# Patient Record
Sex: MALE | Race: WHITE | Employment: FULL TIME | URBAN - METROPOLITAN AREA
[De-identification: names, ages, dates, MRNs, and addresses within clinical notes are randomized per-mention and may not be internally consistent; named-entity substitution may affect disease eponyms.]

---

## 2017-01-09 ENCOUNTER — LAB ONLY (OUTPATIENT)
Dept: ENDOCRINOLOGY | Age: 52
End: 2017-01-09

## 2017-01-09 DIAGNOSIS — E78.5 HYPERLIPIDEMIA LDL GOAL <70: ICD-10-CM

## 2017-01-09 DIAGNOSIS — E11.40 TYPE 2 DIABETES, CONTROLLED, WITH NEUROPATHY (HCC): Primary | ICD-10-CM

## 2017-01-09 DIAGNOSIS — E29.1 HYPOGONADISM IN MALE: ICD-10-CM

## 2017-01-10 LAB
ALBUMIN SERPL-MCNC: 4.4 G/DL (ref 3.5–5.5)
ALBUMIN/CREAT UR: 10.3 MG/G CREAT (ref 0–30)
ALBUMIN/GLOB SERPL: 1.6 {RATIO} (ref 1.1–2.5)
ALP SERPL-CCNC: 91 IU/L (ref 39–117)
ALT SERPL-CCNC: 16 IU/L (ref 0–44)
AST SERPL-CCNC: 16 IU/L (ref 0–40)
BILIRUB SERPL-MCNC: 0.2 MG/DL (ref 0–1.2)
BUN SERPL-MCNC: 14 MG/DL (ref 6–24)
BUN/CREAT SERPL: 19 (ref 9–20)
CALCIUM SERPL-MCNC: 9.4 MG/DL (ref 8.7–10.2)
CHLORIDE SERPL-SCNC: 102 MMOL/L (ref 96–106)
CHOLEST SERPL-MCNC: 170 MG/DL (ref 100–199)
CO2 SERPL-SCNC: 27 MMOL/L (ref 18–29)
COMMENT: NORMAL
CREAT SERPL-MCNC: 0.72 MG/DL (ref 0.76–1.27)
CREAT UR-MCNC: 131.9 MG/DL
EST. AVERAGE GLUCOSE BLD GHB EST-MCNC: 120 MG/DL
GLOBULIN SER CALC-MCNC: 2.7 G/DL (ref 1.5–4.5)
GLUCOSE SERPL-MCNC: 82 MG/DL (ref 65–99)
HBA1C MFR BLD: 5.8 % (ref 4.8–5.6)
HDLC SERPL-MCNC: 61 MG/DL
INTERPRETATION, 910389: NORMAL
LDLC SERPL CALC-MCNC: 85 MG/DL (ref 0–99)
MICROALBUMIN UR-MCNC: 13.6 UG/ML
POTASSIUM SERPL-SCNC: 5.5 MMOL/L (ref 3.5–5.2)
PROT SERPL-MCNC: 7.1 G/DL (ref 6–8.5)
SODIUM SERPL-SCNC: 144 MMOL/L (ref 134–144)
TESTOST SERPL-MCNC: 456 NG/DL (ref 348–1197)
TRIGL SERPL-MCNC: 119 MG/DL (ref 0–149)
VLDLC SERPL CALC-MCNC: 24 MG/DL (ref 5–40)

## 2017-01-16 ENCOUNTER — OFFICE VISIT (OUTPATIENT)
Dept: ENDOCRINOLOGY | Age: 52
End: 2017-01-16

## 2017-01-16 VITALS
SYSTOLIC BLOOD PRESSURE: 138 MMHG | BODY MASS INDEX: 27.22 KG/M2 | HEART RATE: 84 BPM | WEIGHT: 194.4 LBS | DIASTOLIC BLOOD PRESSURE: 84 MMHG | HEIGHT: 71 IN

## 2017-01-16 DIAGNOSIS — E78.5 HYPERLIPIDEMIA LDL GOAL <70: ICD-10-CM

## 2017-01-16 DIAGNOSIS — N52.9 VASCULOGENIC ERECTILE DYSFUNCTION, UNSPECIFIED VASCULOGENIC ERECTILE DYSFUNCTION TYPE: ICD-10-CM

## 2017-01-16 DIAGNOSIS — E11.40 TYPE 2 DIABETES, CONTROLLED, WITH NEUROPATHY (HCC): Primary | ICD-10-CM

## 2017-01-16 DIAGNOSIS — E29.1 HYPOGONADISM IN MALE: ICD-10-CM

## 2017-01-16 RX ORDER — SILDENAFIL 100 MG/1
100 TABLET, FILM COATED ORAL AS NEEDED
Qty: 8 TAB | Refills: 11 | Status: SHIPPED | OUTPATIENT
Start: 2017-01-16 | End: 2017-04-13

## 2017-01-16 RX ORDER — VARDENAFIL HYDROCHLORIDE 20 MG/1
20 TABLET ORAL AS NEEDED
Qty: 4 TAB | Refills: 0 | Status: SHIPPED | COMMUNITY
Start: 2017-01-16 | End: 2017-04-10

## 2017-01-16 RX ORDER — SILDENAFIL CITRATE 20 MG/1
TABLET ORAL
Qty: 20 TAB | Refills: 11 | Status: SHIPPED | OUTPATIENT
Start: 2017-01-16 | End: 2017-07-17

## 2017-01-16 NOTE — MR AVS SNAPSHOT
Visit Information Date & Time Provider Department Dept. Phone Encounter #  
 1/16/2017 11:30 AM Eboni Berumen, [address] [place] [address] Diabetes and Endocrinology [phone] [id number] Follow-up Instructions Return in about 6 months (around 7/16/2017) for fasting labs. Upcoming Health Maintenance Date Due Hepatitis C Screening 1965 EYE EXAM RETINAL OR DILATED Q1 4/22/1975 Pneumococcal 19-64 Medium Risk (1 of 1 - PPSV23) 4/22/1984 DTaP/Tdap/Td series (1 - Tdap) 4/22/1986 FOBT Q 1 YEAR AGE 50-75 4/22/2015 INFLUENZA AGE 9 TO ADULT 8/1/2016 HEMOGLOBIN A1C Q6M 7/9/2017 FOOT EXAM Q1 7/18/2017 MICROALBUMIN Q1 1/9/2018 LIPID PANEL Q1 1/9/2018 Allergies as of 1/16/2017  Review Complete On: 1/16/2017 By: Eboni Berumen MD  
 No Known Allergies Current Immunizations  Never Reviewed No immunizations on file. Not reviewed this visit Vitals BP Pulse Height(growth percentile) Weight(growth percentile) BMI Smoking Status 138/84 84 5' 11\" (1.803 m) 194 lb 6.4 oz (88.2 kg) 27.11 kg/m2 Current Every Day Smoker Vitals History BMI and BSA Data Body Mass Index Body Surface Area  
 27.11 kg/m 2 2.1 m 2 Preferred Pharmacy Pharmacy Name Phone [id number] [address] [place] [address] [phone] Your Updated Medication List  
  
   
This list is accurate as of: 1/16/17 12:17 PM.  Always use your most recent med list.  
  
  
  
  
 atorvastatin 20 mg tablet Commonly known as:  LIPITOR Take  by mouth daily. DAILY MULTIVITAMIN PO Take  by mouth.  
  
 gabapentin 300 mg capsule Commonly known as:  NEURONTIN Take 300 mg by mouth three (3) times daily. metFORMIN  mg tablet Commonly known as:  GLUCOPHAGE XR  
1 tab twice daily * sildenafil citrate 100 mg tablet Commonly known as:  VIAGRA Take 1 Tab by mouth as needed. * sildenafil 20 mg tablet Commonly known as:  REVATIO Take up to 5 tabs as needed prior to activity  
  
 vardenafil 20 mg tablet Commonly known as:  LEVITRA Take 20 mg by mouth as needed. * Notice: This list has 2 medication(s) that are the same as other medications prescribed for you. Read the directions carefully, and ask your doctor or other care provider to review them with you. Prescriptions Printed Refills  
 sildenafil citrate (VIAGRA) 100 mg tablet 11 Sig: Take 1 Tab by mouth as needed. Class: Print Route: Oral  
 sildenafil (REVATIO) 20 mg tablet 11 Sig: Take up to 5 tabs as needed prior to activity Class: Print Follow-up Instructions Return in about 6 months (around 7/16/2017) for fasting labs. Patient Instructions 1) Revatio is generic for viagra. They come in 20 mg tablets and often you need to take 3 to 5 tablets at a max dose to see any effect on erectile function. Take this 30-60 minutes before activity and it will last in your system 4-6 hours. Take this to [facility] as this is the cheapest place to buy this. I will also print out a prescription for brand viagra as this may be covered at the 100 mg tabs and you can try taking 1/2 tab as needed. 2) Your testosterone level is back to normal with 40 more lbs of weight loss so this is not the cause of ED. 3) Your Hemoglobin A1c (3 month test of blood sugar) continues to improve with weight loss. Keep taking metformin 1 tab twice daily. 4) Your cholesterol is much better with weight loss. 5) Try cutting back on the bananas to 1/2 per day as your potassium was slightly high at 5.5 and this can cause leg cramps. 6) You can Dr. Mamta Jenkins at [phone] or Dr. Macho Styles at [phone] or Dr. Eric Salcido [phone] for podiatry to have your foot wound evaluated. Introducing [address]! [facility] introduces [employer] patient portal. Now you can access parts of your medical record, email your doctor's office, and request medication refills online. 1. In your internet browser, go to [email or web address] 2. Click on the First Time User? Click Here link in the Sign In box. You will see the New Member Sign Up page. 3. Enter your [employer] Access Code exactly as it appears below. You will not need to use this code after youve completed the sign-up process. If you do not sign up before the expiration date, you must request a new code. · [employer] Access Code: [email or web address] Expires: 4/16/2017 12:16 PM 
 
4. Enter the last four digits of your Social Security Number (xxxx) and Date of Birth (mm/dd/yyyy) as indicated and click Submit. You will be taken to the next sign-up page. 5. Create a [employer] ID. This will be your [employer] login ID and cannot be changed, so think of one that is secure and easy to remember. 6. Create a [employer] password. You can change your password at any time. 7. Enter your Password Reset Question and Answer. This can be used at a later time if you forget your password. 8. Enter your e-mail address. You will receive e-mail notification when new information is available in [address] 9. Click Sign Up. You can now view and download portions of your medical record. 10. Click the Download Summary menu link to download a portable copy of your medical information. If you have questions, please visit the Frequently Asked Questions section of the [employer] website. Remember, [employer] is NOT to be used for urgent needs. For medical emergencies, dial 911. Now available from your iPhone and Android! Please provide this summary of care documentation to your next provider. Your primary care clinician is listed as GALO Cisneros 47. If you have any questions after today's visit, please call [phone].

## 2017-01-16 NOTE — PROGRESS NOTES
Chief Complaint   Patient presents with    Diabetes     testosterone f/u    Other     pt does not know the name of his pcp and pharmacy confirmed     History of Present Illness: Annie Reynolds is a 46 y.o. male here for follow up of diabetes. Weight down 40 lbs since last visit in 7/16. Has been walking between 5-10 miles a day and has been eating more fruits and vegetables. Has cut out snack foods and cheese. Has been taking metformin 1 tab bid and lipitor 20 mg daily and gabapentin 300 mg tid. Nerve pain has been better. The sample of levitra did help but can't afford and it appears viagra may be covered so will try this instead. Does have some leg cramps that can occur a few times a week. Has been eating more bananas and oranges. 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 Completed the course of keflex for 7 days. Gave him names of podiatrists to f/u with. Has increased his smoking from 5 cig/day up to 20/day. Still drinking about the same. Current Outpatient Prescriptions   Medication Sig    atorvastatin (LIPITOR) 20 mg tablet Take  by mouth daily.  gabapentin (NEURONTIN) 300 mg capsule Take 300 mg by mouth three (3) times daily.  metFORMIN ER (GLUCOPHAGE XR) 500 mg tablet 1 tab twice daily    MV-MN/FA/VIT K/LYCOP/LUT/COQ10 (DAILY MULTIVITAMIN PO) Take  by mouth. No current facility-administered medications for this visit. No Known Allergies     Review of Systems:  - Eyes: no blurry vision or double vision  - Cardiovascular: no chest pain  - Respiratory: no shortness of breath  - Musculoskeletal: no myalgias  - Neurological: decreased numbness/tingling in extremities    Physical Examination:  Blood pressure 138/84, pulse 84, height 5' 11\" (1.803 m), weight 194 lb 6.4 oz (88.2 kg).   - General: pleasant, no distress, good eye contact - Neck: no carotid bruits  - Cardiovascular: regular, normal rate, nl s1 and s2, no m/r/g,   - Respiratory: clear bilaterally  - Integumentary: no edema, (+) 2x2 cm right plantar ulcer with clean base and no drainage and no erythema  - Psychiatric: normal mood and affect    Data Reviewed:   Component      Latest Ref Rng & Units 1/9/2017 1/9/2017 1/9/2017 1/9/2017           9:16 AM  9:16 AM  9:16 AM  9:16 AM   Glucose      65 - 99 mg/dL    82   BUN      6 - 24 mg/dL    14   Creatinine      0.76 - 1.27 mg/dL    0.72 (L)   GFR est non-AA      >59 mL/min/1.73    108   GFR est AA      >59 mL/min/1.73    125   BUN/Creatinine ratio      9 - 20    19   Sodium      134 - 144 mmol/L    144   Potassium      3.5 - 5.2 mmol/L    5.5 (H)   Chloride      96 - 106 mmol/L    102   CO2      18 - 29 mmol/L    27   Calcium      8.7 - 10.2 mg/dL    9.4   Protein, total      6.0 - 8.5 g/dL    7.1   Albumin      3.5 - 5.5 g/dL    4.4   GLOBULIN, TOTAL      1.5 - 4.5 g/dL    2.7   A-G Ratio      1.1 - 2.5    1.6   Bilirubin, total      0.0 - 1.2 mg/dL    0.2   Alk. phosphatase      39 - 117 IU/L    91   AST      0 - 40 IU/L    16   ALT      0 - 44 IU/L    16   Cholesterol, total      100 - 199 mg/dL   170    Triglyceride      0 - 149 mg/dL   119    HDL Cholesterol      >39 mg/dL   61    VLDL, calculated      5 - 40 mg/dL   24    LDL, calculated      0 - 99 mg/dL   85    Testosterone      348 - 1197 ng/dL  456     Comment        Comment     Hemoglobin A1c, (calculated)      4.8 - 5.6 % 5.8 (H)      Estimated average glucose      mg/dL 120        Component      Latest Ref Rng & Units 1/9/2017           9:16 AM   Creatinine, urine      Not Estab. mg/dL 131.9   Microalbumin, urine      Not Estab. ug/mL 13.6   Microalbumin/Creat. Ratio      0.0 - 30.0 mg/g creat 10.3       Assessment/Plan:     1.  Type 2 diabetes, controlled, with neuropathy ([address]): his most recent Hgb A1c was 5.8% in 1/17 down from 6.4% in 7/16 stable from 3/16 down from 7% in 12/15 when diagnosed with DM. Metformin is working well and will hold on checking sugars as long as A1c is <7%. Has lost 58 lbs since 12/15.  - cont metformin  mg bid  - foot exam done 7/16  - optho UTD 5/16--will obtain report  - microalbumin nl 1/17  - his BP was at goal < 140/90 not on any meds  - check A1c and cmp prior to next visit      2. Hyperlipidemia LDL goal <70: LDL 78 in 3/16 after being started on lipitor 20 in 12/15 when  and TG 1152. LDL 71 and  in 7/16 and TG down to 119 and LDL 85 in 1/17 with 40 lb wt loss  - cont lipitor 20 mg daily  - check lipids prior to next visit     3. Vasculogenic erectile dysfunction, unspecified vasculogenic erectile dysfunction type: told him that his ED is likely multifactorial due to DM, hyperlipidemia, ETOH and smoking.  - levitra 20 mg as needed      4. Hypogonadism in male: level was 156 in 12/15 but free T normal at 5.27 and prolactin normal at 9.2. T up to 298 in 7/16 with 18 lb wt loss and up to 456 in 1/17 with 40 lb wt loss  - check total testosterone level in 1/18        Patient Instructions   1) Revatio is generic for viagra. They come in 20 mg tablets and often you need to take 3 to 5 tablets at a max dose to see any effect on erectile function. Take this 30-60 minutes before activity and it will last in your system 4-6 hours. Take this to [facility] as this is the cheapest place to buy this. I will also print out a prescription for brand viagra as this may be covered at the 100 mg tabs and you can try taking 1/2 tab as needed. 2) Your testosterone level is back to normal with 40 more lbs of weight loss so this is not the cause of ED. 3) Your Hemoglobin A1c (3 month test of blood sugar) continues to improve with weight loss. Keep taking metformin 1 tab twice daily. 4) Your cholesterol is much better with weight loss.     5) Try cutting back on the bananas to 1/2 per day as your potassium was slightly high at 5.5 and this can cause leg cramps. 6) You can Dr. Minetta Cowden at [phone] or Dr. Park Dumont at [phone] or Dr. Uriah Cui [phone] for podiatry to have your foot wound evaluated. Follow-up Disposition:  Return in about 6 months (around 7/16/2017) for fasting labs.     Copy sent to:  Dr. Kenji Guerrero

## 2017-01-16 NOTE — PATIENT INSTRUCTIONS
1) Revatio is generic for viagra. They come in 20 mg tablets and often you need to take 3 to 5 tablets at a max dose to see any effect on erectile function. Take this 30-60 minutes before activity and it will last in your system 4-6 hours. Take this to [facility] as this is the cheapest place to buy this. I will also print out a prescription for brand viagra as this may be covered at the 100 mg tabs and you can try taking 1/2 tab as needed. 2) Your testosterone level is back to normal with 40 more lbs of weight loss so this is not the cause of ED. 3) Your Hemoglobin A1c (3 month test of blood sugar) continues to improve with weight loss. Keep taking metformin 1 tab twice daily. 4) Your cholesterol is much better with weight loss. 5) Try cutting back on the bananas to 1/2 per day as your potassium was slightly high at 5.5 and this can cause leg cramps. 6) You can Dr. Tiago Monk at [phone] or Dr. Mabel Clinton at [phone] or Dr. Lowe [place] [phone] for podiatry to have your foot wound evaluated.

## 2017-04-10 ENCOUNTER — HOSPITAL ENCOUNTER (INPATIENT)
Age: 52
LOS: 2 days | Discharge: HOME | DRG: 504 | End: 2017-04-13
Attending: EMERGENCY MEDICINE | Admitting: INTERNAL MEDICINE
Payer: COMMERCIAL

## 2017-04-10 ENCOUNTER — APPOINTMENT (OUTPATIENT)
Dept: GENERAL RADIOLOGY | Age: 52
DRG: 504 | End: 2017-04-10
Attending: PHYSICIAN ASSISTANT
Payer: COMMERCIAL

## 2017-04-10 DIAGNOSIS — E11.628 DIABETIC FOOT INFECTION (HCC): ICD-10-CM

## 2017-04-10 DIAGNOSIS — L08.9 DIABETIC FOOT INFECTION (HCC): ICD-10-CM

## 2017-04-10 DIAGNOSIS — M86.9 OSTEOMYELITIS OF TOE OF LEFT FOOT (HCC): Primary | ICD-10-CM

## 2017-04-10 DIAGNOSIS — A41.9 SEPSIS, DUE TO UNSPECIFIED ORGANISM: ICD-10-CM

## 2017-04-10 DIAGNOSIS — Z72.0 TOBACCO ABUSE: ICD-10-CM

## 2017-04-10 PROCEDURE — 99283 EMERGENCY DEPT VISIT LOW MDM: CPT

## 2017-04-10 PROCEDURE — 87147 CULTURE TYPE IMMUNOLOGIC: CPT | Performed by: PHYSICIAN ASSISTANT

## 2017-04-10 PROCEDURE — 87070 CULTURE OTHR SPECIMN AEROBIC: CPT | Performed by: PHYSICIAN ASSISTANT

## 2017-04-10 PROCEDURE — 87040 BLOOD CULTURE FOR BACTERIA: CPT | Performed by: PHYSICIAN ASSISTANT

## 2017-04-10 PROCEDURE — 36415 COLL VENOUS BLD VENIPUNCTURE: CPT | Performed by: PHYSICIAN ASSISTANT

## 2017-04-10 PROCEDURE — 83605 ASSAY OF LACTIC ACID: CPT | Performed by: PHYSICIAN ASSISTANT

## 2017-04-10 PROCEDURE — 96365 THER/PROPH/DIAG IV INF INIT: CPT

## 2017-04-10 PROCEDURE — 85025 COMPLETE CBC W/AUTO DIFF WBC: CPT | Performed by: PHYSICIAN ASSISTANT

## 2017-04-10 PROCEDURE — 73630 X-RAY EXAM OF FOOT: CPT

## 2017-04-10 PROCEDURE — 87077 CULTURE AEROBIC IDENTIFY: CPT | Performed by: PHYSICIAN ASSISTANT

## 2017-04-10 PROCEDURE — 77030027138 HC INCENT SPIROMETER -A

## 2017-04-10 PROCEDURE — 74011250636 HC RX REV CODE- 250/636: Performed by: PHYSICIAN ASSISTANT

## 2017-04-10 PROCEDURE — 96361 HYDRATE IV INFUSION ADD-ON: CPT

## 2017-04-10 PROCEDURE — 96367 TX/PROPH/DG ADDL SEQ IV INF: CPT

## 2017-04-10 PROCEDURE — 94761 N-INVAS EAR/PLS OXIMETRY MLT: CPT

## 2017-04-10 PROCEDURE — 96366 THER/PROPH/DIAG IV INF ADDON: CPT

## 2017-04-10 PROCEDURE — 87186 SC STD MICRODIL/AGAR DIL: CPT | Performed by: PHYSICIAN ASSISTANT

## 2017-04-10 PROCEDURE — 80053 COMPREHEN METABOLIC PANEL: CPT | Performed by: PHYSICIAN ASSISTANT

## 2017-04-10 RX ORDER — SODIUM CHLORIDE 0.9 % (FLUSH) 0.9 %
5-10 SYRINGE (ML) INJECTION EVERY 8 HOURS
Status: DISCONTINUED | OUTPATIENT
Start: 2017-04-10 | End: 2017-04-13 | Stop reason: HOSPADM

## 2017-04-10 RX ORDER — SODIUM CHLORIDE 0.9 % (FLUSH) 0.9 %
5-10 SYRINGE (ML) INJECTION AS NEEDED
Status: DISCONTINUED | OUTPATIENT
Start: 2017-04-10 | End: 2017-04-13 | Stop reason: HOSPADM

## 2017-04-10 RX ADMIN — SODIUM CHLORIDE 1000 ML: 900 INJECTION, SOLUTION INTRAVENOUS at 23:51

## 2017-04-10 RX ADMIN — Medication 10 ML: at 22:58

## 2017-04-10 NOTE — IP AVS SNAPSHOT
[address] [facility] 
[phone] Patient: Gilberto Steve MRN: [id number] :1965 You are allergic to the following No active allergies Recent Documentation Height Weight BMI Smoking Status 1.727 m 85.3 kg 28.59 kg/m2 Current Every Day Smoker Unresulted Labs Order Current Status CULTURE, ANAEROBIC In process CULTURE, BLOOD, PAIRED Preliminary result CULTURE, WOUND W GRAM STAIN Preliminary result Emergency Contacts Name Discharge Info Relation Home Work Mobile [address] [facility] CAREGIVER [3] Child [2] [phone] [address] CAREGIVER [3] Sister [23] [phone] About your hospitalization You were admitted on:  2017 You last received care in the:  [facility] 2 GENERAL SURGERY You were discharged on:  2017 Unit phone number:  [phone] Why you were hospitalized Your primary diagnosis was:  Not on File Your diagnoses also included:  Injury Of Toe, Superficial, Infected, Hyperlipidemia Ldl Goal <70, Type 2 Diabetes, Controlled, With Neuropathy (Hcc) Providers Seen During Your Hospitalizations Provider Role Specialty Primary office phone Darien Salas MD Attending Provider Emergency Medicine [phone] Luther Kitchen MD Attending Provider Internal Medicine [phone] Juliana Roach MD Attending Provider Internal Medicine [phone] Your Primary Care Physician (PCP) Primary Care Physician Office Phone Office Fax Peggy Cespedes [phone] Follow-up Information Follow up With Details Comments Contact Info Vitor Ellis MD In 3 days  [address] [facility] 
[phone] Khadijah Mims DPM In 5 days  [address] [facility] [address] [facility] [address] [facility] 
[phone] Current Discharge Medication List  
  
START taking these medications Dose & Instructions Dispensing Information Comments Morning Noon Evening Bedtime  
 amoxicillin-clavulanate 875-125 mg per tablet Commonly known as:  AUGMENTIN Your last dose was: Your next dose is:    
   
   
 Dose:  1 Tab Take 1 Tab by mouth two (2) times a day for 7 days. Quantity:  14 Tab Refills:  0  
     
   
   
   
  
 folic acid 1 mg tablet Commonly known as:  [employer] Your last dose was: Your next dose is:    
   
   
 Dose:  1 mg Take 1 Tab by mouth daily. Quantity:  30 Tab Refills:  0  
     
   
   
   
  
 thiamine 100 mg tablet Commonly known as:  B-1 Your last dose was: Your next dose is:    
   
   
 Dose:  100 mg Take 1 Tab by mouth daily. Quantity:  30 Tab Refills:  0 CONTINUE these medications which have CHANGED Dose & Instructions Dispensing Information Comments Morning Noon Evening Bedtime  
 sildenafil 20 mg tablet Commonly known as:  REVATIO What changed:  Another medication with the same name was removed. Continue taking this medication, and follow the directions you see here. Your last dose was: Your next dose is: Take up to 5 tabs as needed prior to activity Quantity:  20 Tab Refills:  11 CONTINUE these medications which have NOT CHANGED Dose & Instructions Dispensing Information Comments Morning Noon Evening Bedtime  
 atorvastatin 20 mg tablet Commonly known as:  LIPITOR Your last dose was: Your next dose is: Take  by mouth daily. Refills:  0 DAILY MULTIVITAMIN PO Your last dose was: Your next dose is: Take  by mouth. Refills:  0  
     
   
   
   
  
 gabapentin 300 mg capsule Commonly known as:  NEURONTIN Your last dose was: Your next dose is:    
   
   
 Dose:  300 mg Take 300 mg by mouth three (3) times daily. Refills:  0  
     
   
   
   
  
 metFORMIN  mg tablet Commonly known as:  GLUCOPHAGE XR Your last dose was: Your next dose is:    
   
   
 1 tab twice daily Refills:  0 Where to Get Your Medications Information on where to get these meds will be given to you by the nurse or doctor. ! Ask your nurse or doctor about these medications  
  amoxicillin-clavulanate 875-125 mg per tablet  
 folic acid 1 mg tablet  
 thiamine 100 mg tablet Discharge Instructions None Discharge Orders None PhysicianPortal Announcement We are excited to announce that we are making your provider's discharge notes available to you in PhysicianPortal. You will see these notes when they are completed and signed by the physician that discharged you from your recent hospital stay. If you have any questions or concerns about any information you see in PhysicianPortal, please call the Health Information Department where you were seen or reach out to your Primary Care Provider for more information about your plan of care. Introducing [facility] SERVICES! [facility] introduces PhysicianPortal patient portal. Now you can access parts of your medical record, email your doctor's office, and request medication refills online. 1. In your internet browser, go to [email or web address] 2. Click on the First Time User? Click Here link in the Sign In box. You will see the New Member Sign Up page. 3. Enter your [employer]icianPortal Access Code exactly as it appears below. You will not need to use this code after youve completed the sign-up process. If you do not sign up before the expiration date, you must request a new code. · [employer]icianPortal Access Code: [email or web address] Expires: 4/16/2017  1:16 PM 
 
4.  Enter the last four digits of your Social Security Number (xxxx) and Date of Birth (mm/dd/yyyy) as indicated and click Submit. You will be taken to the next sign-up page. 5. Create a [employer] ID. This will be your [employer] login ID and cannot be changed, so think of one that is secure and easy to remember. 6. Create a [employer] password. You can change your password at any time. 7. Enter your Password Reset Question and Answer. This can be used at a later time if you forget your password. 8. Enter your e-mail address. You will receive e-mail notification when new information is available in [address] 9. Click Sign Up. You can now view and download portions of your medical record. 10. Click the Download Summary menu link to download a portable copy of your medical information. If you have questions, please visit the Frequently Asked Questions section of the [employer] website. Remember, [employer] is NOT to be used for urgent needs. For medical emergencies, dial 911. Now available from your iPhone and Android! General Information Please provide this summary of care documentation to your next provider. Patient Signature:  ____________________________________________________________ Date:  ____________________________________________________________  
  
[facility] Provider Signature:  ____________________________________________________________ Date:  ____________________________________________________________

## 2017-04-11 ENCOUNTER — APPOINTMENT (OUTPATIENT)
Dept: MRI IMAGING | Age: 52
DRG: 504 | End: 2017-04-11
Attending: INTERNAL MEDICINE
Payer: COMMERCIAL

## 2017-04-11 PROBLEM — L08.9: Status: ACTIVE | Noted: 2017-04-11

## 2017-04-11 PROBLEM — S90.936A: Status: ACTIVE | Noted: 2017-04-11

## 2017-04-11 LAB
ALBUMIN SERPL BCP-MCNC: 3.2 G/DL (ref 3.5–5)
ALBUMIN/GLOB SERPL: 0.8 {RATIO} (ref 1.1–2.2)
ALP SERPL-CCNC: 78 U/L (ref 45–117)
ALT SERPL-CCNC: 19 U/L (ref 12–78)
ANION GAP BLD CALC-SCNC: 13 MMOL/L (ref 5–15)
AST SERPL W P-5'-P-CCNC: 9 U/L (ref 15–37)
BASOPHILS # BLD AUTO: 0.1 K/UL (ref 0–0.1)
BASOPHILS # BLD: 1 % (ref 0–1)
BILIRUB SERPL-MCNC: 0.1 MG/DL (ref 0.2–1)
BUN SERPL-MCNC: 11 MG/DL (ref 6–20)
BUN/CREAT SERPL: 17 (ref 12–20)
CALCIUM SERPL-MCNC: 8.7 MG/DL (ref 8.5–10.1)
CHLORIDE SERPL-SCNC: 109 MMOL/L (ref 97–108)
CO2 SERPL-SCNC: 23 MMOL/L (ref 21–32)
CREAT SERPL-MCNC: 0.66 MG/DL (ref 0.7–1.3)
EOSINOPHIL # BLD: 0.3 K/UL (ref 0–0.4)
EOSINOPHIL NFR BLD: 4 % (ref 0–7)
ERYTHROCYTE [DISTWIDTH] IN BLOOD BY AUTOMATED COUNT: 13 % (ref 11.5–14.5)
GLOBULIN SER CALC-MCNC: 4.2 G/DL (ref 2–4)
GLUCOSE BLD STRIP.AUTO-MCNC: 131 MG/DL (ref 65–100)
GLUCOSE BLD STRIP.AUTO-MCNC: 79 MG/DL (ref 65–100)
GLUCOSE BLD STRIP.AUTO-MCNC: 79 MG/DL (ref 65–100)
GLUCOSE BLD STRIP.AUTO-MCNC: 87 MG/DL (ref 65–100)
GLUCOSE BLD STRIP.AUTO-MCNC: 92 MG/DL (ref 65–100)
GLUCOSE BLD STRIP.AUTO-MCNC: 99 MG/DL (ref 65–100)
GLUCOSE SERPL-MCNC: 84 MG/DL (ref 65–100)
HCT VFR BLD AUTO: 41.1 % (ref 36.6–50.3)
HGB BLD-MCNC: 13.6 G/DL (ref 12.1–17)
LACTATE SERPL-SCNC: 1.7 MMOL/L (ref 0.4–2)
LACTATE SERPL-SCNC: 3.2 MMOL/L (ref 0.4–2)
LYMPHOCYTES # BLD AUTO: 32 % (ref 12–49)
LYMPHOCYTES # BLD: 3 K/UL (ref 0.8–3.5)
MCH RBC QN AUTO: 30.6 PG (ref 26–34)
MCHC RBC AUTO-ENTMCNC: 33.1 G/DL (ref 30–36.5)
MCV RBC AUTO: 92.6 FL (ref 80–99)
MONOCYTES # BLD: 0.4 K/UL (ref 0–1)
MONOCYTES NFR BLD AUTO: 5 % (ref 5–13)
NEUTS SEG # BLD: 5.5 K/UL (ref 1.8–8)
NEUTS SEG NFR BLD AUTO: 58 % (ref 32–75)
PLATELET # BLD AUTO: 291 K/UL (ref 150–400)
POTASSIUM SERPL-SCNC: 4 MMOL/L (ref 3.5–5.1)
PROT SERPL-MCNC: 7.4 G/DL (ref 6.4–8.2)
RBC # BLD AUTO: 4.44 M/UL (ref 4.1–5.7)
SERVICE CMNT-IMP: ABNORMAL
SERVICE CMNT-IMP: NORMAL
SODIUM SERPL-SCNC: 145 MMOL/L (ref 136–145)
WBC # BLD AUTO: 9.3 K/UL (ref 4.1–11.1)

## 2017-04-11 PROCEDURE — 74011250637 HC RX REV CODE- 250/637: Performed by: INTERNAL MEDICINE

## 2017-04-11 PROCEDURE — 74011250636 HC RX REV CODE- 250/636: Performed by: INTERNAL MEDICINE

## 2017-04-11 PROCEDURE — 65270000029 HC RM PRIVATE

## 2017-04-11 PROCEDURE — 82962 GLUCOSE BLOOD TEST: CPT

## 2017-04-11 PROCEDURE — 74011250636 HC RX REV CODE- 250/636: Performed by: EMERGENCY MEDICINE

## 2017-04-11 PROCEDURE — 74011000258 HC RX REV CODE- 258: Performed by: EMERGENCY MEDICINE

## 2017-04-11 PROCEDURE — 83605 ASSAY OF LACTIC ACID: CPT | Performed by: INTERNAL MEDICINE

## 2017-04-11 PROCEDURE — A9585 GADOBUTROL INJECTION: HCPCS | Performed by: INTERNAL MEDICINE

## 2017-04-11 PROCEDURE — 73720 MRI LWR EXTREMITY W/O&W/DYE: CPT

## 2017-04-11 PROCEDURE — 74011000250 HC RX REV CODE- 250: Performed by: INTERNAL MEDICINE

## 2017-04-11 RX ORDER — SODIUM CHLORIDE 0.9 % (FLUSH) 0.9 %
5-10 SYRINGE (ML) INJECTION AS NEEDED
Status: DISCONTINUED | OUTPATIENT
Start: 2017-04-11 | End: 2017-04-12 | Stop reason: SDUPTHER

## 2017-04-11 RX ORDER — INSULIN LISPRO 100 [IU]/ML
INJECTION, SOLUTION INTRAVENOUS; SUBCUTANEOUS
Status: DISCONTINUED | OUTPATIENT
Start: 2017-04-11 | End: 2017-04-13 | Stop reason: HOSPADM

## 2017-04-11 RX ORDER — ONDANSETRON 2 MG/ML
4 INJECTION INTRAMUSCULAR; INTRAVENOUS
Status: DISCONTINUED | OUTPATIENT
Start: 2017-04-11 | End: 2017-04-13 | Stop reason: HOSPADM

## 2017-04-11 RX ORDER — ATORVASTATIN CALCIUM 20 MG/1
20 TABLET, FILM COATED ORAL DAILY
Status: DISCONTINUED | OUTPATIENT
Start: 2017-04-11 | End: 2017-04-13 | Stop reason: HOSPADM

## 2017-04-11 RX ORDER — SODIUM CHLORIDE 0.9 % (FLUSH) 0.9 %
5-10 SYRINGE (ML) INJECTION EVERY 8 HOURS
Status: DISCONTINUED | OUTPATIENT
Start: 2017-04-11 | End: 2017-04-12 | Stop reason: SDUPTHER

## 2017-04-11 RX ORDER — FOLIC ACID 1 MG/1
1 TABLET ORAL DAILY
Status: DISCONTINUED | OUTPATIENT
Start: 2017-04-11 | End: 2017-04-13 | Stop reason: HOSPADM

## 2017-04-11 RX ORDER — LANOLIN ALCOHOL/MO/W.PET/CERES
100 CREAM (GRAM) TOPICAL DAILY
Status: DISCONTINUED | OUTPATIENT
Start: 2017-04-11 | End: 2017-04-13 | Stop reason: HOSPADM

## 2017-04-11 RX ORDER — ENOXAPARIN SODIUM 100 MG/ML
40 INJECTION SUBCUTANEOUS EVERY 24 HOURS
Status: DISCONTINUED | OUTPATIENT
Start: 2017-04-11 | End: 2017-04-12

## 2017-04-11 RX ORDER — ACETAMINOPHEN 325 MG/1
650 TABLET ORAL
Status: DISCONTINUED | OUTPATIENT
Start: 2017-04-11 | End: 2017-04-13 | Stop reason: HOSPADM

## 2017-04-11 RX ORDER — VANCOMYCIN 2 GRAM/500 ML IN 0.9 % SODIUM CHLORIDE INTRAVENOUS
2000 ONCE
Status: COMPLETED | OUTPATIENT
Start: 2017-04-11 | End: 2017-04-11

## 2017-04-11 RX ORDER — DEXTROSE 50 % IN WATER (D50W) INTRAVENOUS SYRINGE
12.5-25 AS NEEDED
Status: DISCONTINUED | OUTPATIENT
Start: 2017-04-11 | End: 2017-04-13 | Stop reason: HOSPADM

## 2017-04-11 RX ORDER — GABAPENTIN 300 MG/1
300 CAPSULE ORAL 3 TIMES DAILY
Status: DISCONTINUED | OUTPATIENT
Start: 2017-04-11 | End: 2017-04-13 | Stop reason: HOSPADM

## 2017-04-11 RX ORDER — MAGNESIUM SULFATE 100 %
4 CRYSTALS MISCELLANEOUS AS NEEDED
Status: DISCONTINUED | OUTPATIENT
Start: 2017-04-11 | End: 2017-04-13 | Stop reason: HOSPADM

## 2017-04-11 RX ORDER — DIAZEPAM 5 MG/1
20 TABLET ORAL
Status: DISCONTINUED | OUTPATIENT
Start: 2017-04-11 | End: 2017-04-13 | Stop reason: HOSPADM

## 2017-04-11 RX ORDER — VANCOMYCIN HYDROCHLORIDE
1250 EVERY 8 HOURS
Status: DISCONTINUED | OUTPATIENT
Start: 2017-04-11 | End: 2017-04-13

## 2017-04-11 RX ORDER — DIAZEPAM 5 MG/1
10 TABLET ORAL
Status: DISCONTINUED | OUTPATIENT
Start: 2017-04-11 | End: 2017-04-13 | Stop reason: HOSPADM

## 2017-04-11 RX ORDER — SODIUM CHLORIDE 9 MG/ML
75 INJECTION, SOLUTION INTRAVENOUS CONTINUOUS
Status: DISCONTINUED | OUTPATIENT
Start: 2017-04-11 | End: 2017-04-13

## 2017-04-11 RX ADMIN — Medication 10 ML: at 21:56

## 2017-04-11 RX ADMIN — Medication 100 MG: at 11:06

## 2017-04-11 RX ADMIN — Medication 10 ML: at 09:47

## 2017-04-11 RX ADMIN — SODIUM CHLORIDE 1000 ML: 900 INJECTION, SOLUTION INTRAVENOUS at 01:21

## 2017-04-11 RX ADMIN — Medication 10 ML: at 14:34

## 2017-04-11 RX ADMIN — GABAPENTIN 300 MG: 300 CAPSULE ORAL at 09:43

## 2017-04-11 RX ADMIN — SODIUM CHLORIDE 75 ML/HR: 900 INJECTION, SOLUTION INTRAVENOUS at 09:55

## 2017-04-11 RX ADMIN — ATORVASTATIN CALCIUM 20 MG: 20 TABLET, FILM COATED ORAL at 09:43

## 2017-04-11 RX ADMIN — GADOBUTROL 8 ML: 604.72 INJECTION INTRAVENOUS at 21:19

## 2017-04-11 RX ADMIN — PIPERACILLIN SODIUM,TAZOBACTAM SODIUM 3.38 G: 3; .375 INJECTION, POWDER, FOR SOLUTION INTRAVENOUS at 21:55

## 2017-04-11 RX ADMIN — DEXTROSE MONOHYDRATE 25 G: 25 INJECTION, SOLUTION INTRAVENOUS at 16:57

## 2017-04-11 RX ADMIN — GABAPENTIN 300 MG: 300 CAPSULE ORAL at 21:55

## 2017-04-11 RX ADMIN — VANCOMYCIN HYDROCHLORIDE 2000 MG: 10 INJECTION, POWDER, LYOPHILIZED, FOR SOLUTION INTRAVENOUS at 02:26

## 2017-04-11 RX ADMIN — ENOXAPARIN SODIUM 40 MG: 40 INJECTION SUBCUTANEOUS at 09:44

## 2017-04-11 RX ADMIN — PIPERACILLIN SODIUM,TAZOBACTAM SODIUM 3.38 G: 3; .375 INJECTION, POWDER, FOR SOLUTION INTRAVENOUS at 01:19

## 2017-04-11 RX ADMIN — MULTIPLE VITAMINS W/ MINERALS TAB 1 TABLET: TAB at 11:06

## 2017-04-11 RX ADMIN — VANCOMYCIN HYDROCHLORIDE 1250 MG: 10 INJECTION, POWDER, LYOPHILIZED, FOR SOLUTION INTRAVENOUS at 14:35

## 2017-04-11 RX ADMIN — SODIUM CHLORIDE 1600 ML: 900 INJECTION, SOLUTION INTRAVENOUS at 01:59

## 2017-04-11 RX ADMIN — Medication 10 ML: at 01:23

## 2017-04-11 RX ADMIN — GABAPENTIN 300 MG: 300 CAPSULE ORAL at 16:32

## 2017-04-11 RX ADMIN — PIPERACILLIN SODIUM,TAZOBACTAM SODIUM 3.38 G: 3; .375 INJECTION, POWDER, FOR SOLUTION INTRAVENOUS at 09:46

## 2017-04-11 RX ADMIN — FOLIC ACID 1 MG: 1 TABLET ORAL at 09:44

## 2017-04-11 NOTE — PROGRESS NOTES
Hospitalist Progress Note    NAME: Chavo Bucio   :  1965   MRN:  [id number]       Interim Hospital Summary: 46 y.o. male whom presented on 4/10/2017 with      Assessment / Plan:  Sepsis with sinus tach and lactic acidosis  Due to Injury of L 5th toe, infected   Lactic acidosis, resolved  -concerning for osteo as hard to see osteal changes with fracture  -awaiting MRI  -continue gentle IVF, empiric vancomycin and zosyn  -f/u with wound and BC  -podiatry consulted     Type 2 diabetes, controlled, with neuropathy  -SSI     Hyperlipidemia  -continue stain     Smoking  -smoking cessation counseled b  -declined nicotine patch     Alcohol use, ? Abuse  -CIWa with valium  -Thiamine, MVI and folic acid     Code Status: full     DVT Prophylaxis: Lovenox  GI Prophylaxis: not indicated     Baseline: functional       Subjective:     Chief Complaint / Reason for Physician Visit  Patient seen and examined. Discussed with RN events overnight. Review of Systems:  Symptom Y/N Comments  Symptom Y/N Comments   Fever/Chills n   Chest Pain n    Poor Appetite n   Edema y L foot   Cough n   Abdominal Pain     Sputum    Joint Pain y L foot   SOB/SETH n   Pruritis/Rash     Nausea/vomit    Tolerating PT/OT     Diarrhea    Tolerating Diet     Constipation    Other       Could NOT obtain due to:      Objective:     VITALS:   Last 24hrs VS reviewed since prior progress note. Most recent are:  Patient Vitals for the past 24 hrs:   Temp Pulse Resp BP SpO2   17 0812 98 °F (36.7 °C) 71 18 123/86 100 %   17 0600 - - - 121/71 97 %   17 0500 - 97 18 115/69 97 %   17 0400 - - - 120/72 96 %   17 0300 - - - 126/68 98 %   17 0238 - - - - 99 %   17 0236 - - - 119/69 -   04/10/17 2013 97.9 °F (36.6 °C) (!) 106 16 149/75 98 %     No intake or output data in the 24 hours ending 17 1149     PHYSICAL EXAM:  General: WD, WN. Alert, cooperative, no acute distress    EENT:  EOMI. Anicteric sclerae. MMM  Resp:  CTA bilaterally, no wheezing or rales. No accessory muscle use  CV:  Regular  rhythm,  No edema  GI:  Soft, Non distended, Non tender.  +Bowel sounds  Neurologic:  Alert and oriented X 3, normal speech  Ext:   L 5th toe infection with evidence of pus drainage  Psych:   Good insight. Not anxious nor agitated  Skin:  No rashes. No jaundice    Reviewed most current lab test results and cultures  YES  Reviewed most current radiology test results   YES  Review and summation of old records today    NO  Reviewed patient's current orders and MAR    YES  PMH/SH reviewed - no change compared to H&P  ________________________________________________________________________  Care Plan discussed with:    Comments   Patient x    Family      RN x    Care Manager     Consultant                        Multidiciplinary team rounds were held today with , nursing, pharmacist and clinical coordinator. Patient's plan of care was discussed; medications were reviewed and discharge planning was addressed. ________________________________________________________________________  Total NON critical care TIME:  35   Minutes    Total CRITICAL CARE TIME Spent:   Minutes non procedure based      Comments   >50% of visit spent in counseling and coordination of care     ________________________________________________________________________  Iman Davenport MD     Procedures: see electronic medical records for all procedures/Xrays and details which were not copied into this note but were reviewed prior to creation of Plan. LABS:  I reviewed today's most current labs and imaging studies.   Pertinent labs include:  Recent Labs      04/10/17   2355   WBC  9.3   HGB  13.6   HCT  41.1   PLT  291     Recent Labs      04/10/17   2355   NA  145   K  4.0   CL  109*   CO2  23   GLU  84   BUN  11   CREA  0.66*   CA  8.7   ALB  3.2*   TBILI  0.1*   SGOT  9*   ALT  19       Signed: Iman Davenport MD

## 2017-04-11 NOTE — CONSULTS
Podiatry History and Physical    Subjective:         Date of Consultation:  April 11, 2017    Referring Physician: Dr. Chu Olson    Patient is a 46 y.o.  male who is being seen for left 5th toe infection with likely osteomyelitis. Workup has revealed denies any n/f/v/c. Pending MRI of the foot. Patient Active Problem List    Diagnosis Date Noted    Injury of toe, superficial, infected 04/11/2017    Type 2 diabetes, controlled, with neuropathy ([facility] [address] 07/18/2016    Hyperlipidemia LDL goal <70 07/18/2016    Vasculogenic erectile dysfunction 07/18/2016    Hypogonadism in male 07/18/2016     Past Medical History:   Diagnosis Date    Hyperlipidemia LDL goal <100     Type 2 diabetes mellitus ([facility] [address]     with neuropathy      Family History   Problem Relation Age of Onset    Diabetes Mother     Heart Disease Mother      cabg    Diabetes Father     Heart Disease Father      cabg    MS Paternal Grandfather       Social History   Substance Use Topics    Smoking status: Current Every Day Smoker     Packs/day: 1.00     Years: 35.00     Types: Cigarettes    Smokeless tobacco: Not on file    Alcohol use 0.0 oz/week     0 Standard drinks or equivalent per week      Comment: pt reports fifth of bourbon 4-5 days a week     History reviewed. No pertinent surgical history. Prior to Admission medications    Medication Sig Start Date End Date Taking? Authorizing Provider   sildenafil citrate (VIAGRA) 100 mg tablet Take 1 Tab by mouth as needed. 1/16/17  Yes Hailey Duffy MD   sildenafil (REVATIO) 20 mg tablet Take up to 5 tabs as needed prior to activity 1/16/17  Yes Hailey Duffy MD   atorvastatin (LIPITOR) 20 mg tablet Take  by mouth daily. Yes Historical Provider   gabapentin (NEURONTIN) 300 mg capsule Take 300 mg by mouth three (3) times daily.    Yes Historical Provider   metFORMIN ER (GLUCOPHAGE XR) 500 mg tablet 1 tab twice daily 7/10/16  Yes Historical Provider   MV-MN/FA/VIT K/LYCOP/LUT/COQ10 (DAILY MULTIVITAMIN PO) Take  by mouth. Yes Historical Provider     No Known Allergies     Review of Systems:  A comprehensive review of systems was negative except for that written in the HPI. Objective:     Patient Vitals for the past 8 hrs:   BP Temp Pulse Resp SpO2   17 1450 133/76 98.2 °F (36.8 °C) 69 16 100 %   17 1224 132/79 98.6 °F (37 °C) 75 18 100 %     Temp (24hrs), Av.2 °F (36.8 °C), Min:97.9 °F (36.6 °C), Max:98.6 °F (37 °C)      General:  Alert, cooperative, well noursished, well developed, appears stated age   Eyes:  Sclera anicteric. Pupils equally round and reactive to light. Mouth/Throat: Mucous membranes normal, oral pharynx clear   Neck: Supple   Lungs:   Clear to auscultation bilaterally, good effort   CV:  Regular rate and rhythm,no murmur, click, rub or gallop   Abdomen:   Soft, non-tender. bowel sounds normal. non-distended   Extremities: Right plantar foot ulcer. Skin: Left 5ht toe with swelling, ulcer and purulent drainage. Noted probing to bone.    Lymph nodes: Cervical and supraclavicular normal   Musculoskeletal: No swelling or deformity   Lines/Devices:  Intact, no erythema, drainage or tenderness   Psych: Alert and oriented, normal mood affect given the setting           Data Review:   Recent Results (from the past 24 hour(s))   CULTURE, BLOOD, PAIRED    Collection Time: 04/10/17 11:46 PM   Result Value Ref Range    Special Requests: NO SPECIAL REQUESTS      Culture result: NO GROWTH AFTER 7 HOURS     CBC WITH AUTOMATED DIFF    Collection Time: 04/10/17 11:55 PM   Result Value Ref Range    WBC 9.3 4.1 - 11.1 K/uL    RBC 4.44 4.10 - 5.70 M/uL    HGB 13.6 12.1 - 17.0 g/dL    HCT 41.1 36.6 - 50.3 %    MCV 92.6 80.0 - 99.0 FL    MCH 30.6 26.0 - 34.0 PG    MCHC 33.1 30.0 - 36.5 g/dL    RDW 13.0 11.5 - 14.5 %    PLATELET 211 954 - 902 K/uL    NEUTROPHILS 58 32 - 75 %    LYMPHOCYTES 32 12 - 49 %    MONOCYTES 5 5 - 13 %    EOSINOPHILS 4 0 - 7 %    BASOPHILS 1 0 - 1 % ABS. NEUTROPHILS 5.5 1.8 - 8.0 K/UL    ABS. LYMPHOCYTES 3.0 0.8 - 3.5 K/UL    ABS. MONOCYTES 0.4 0.0 - 1.0 K/UL    ABS. EOSINOPHILS 0.3 0.0 - 0.4 K/UL    ABS. BASOPHILS 0.1 0.0 - 0.1 K/UL   METABOLIC PANEL, COMPREHENSIVE    Collection Time: 04/10/17 11:55 PM   Result Value Ref Range    Sodium 145 136 - 145 mmol/L    Potassium 4.0 3.5 - 5.1 mmol/L    Chloride 109 (H) 97 - 108 mmol/L    CO2 23 21 - 32 mmol/L    Anion gap 13 5 - 15 mmol/L    Glucose 84 65 - 100 mg/dL    BUN 11 6 - 20 MG/DL    Creatinine 0.66 (L) 0.70 - 1.30 MG/DL    BUN/Creatinine ratio 17 12 - 20      GFR est AA >60 >60 ml/min/1.73m2    GFR est non-AA >60 >60 ml/min/1.73m2    Calcium 8.7 8.5 - 10.1 MG/DL    Bilirubin, total 0.1 (L) 0.2 - 1.0 MG/DL    ALT (SGPT) 19 12 - 78 U/L    AST (SGOT) 9 (L) 15 - 37 U/L    Alk.  phosphatase 78 45 - 117 U/L    Protein, total 7.4 6.4 - 8.2 g/dL    Albumin 3.2 (L) 3.5 - 5.0 g/dL    Globulin 4.2 (H) 2.0 - 4.0 g/dL    A-G Ratio 0.8 (L) 1.1 - 2.2     CULTURE, WOUND W GRAM STAIN    Collection Time: 04/10/17 11:55 PM   Result Value Ref Range    Special Requests: NO SPECIAL REQUESTS      GRAM STAIN FEW  WBCS SEEN        GRAM STAIN 4+  GRAM POSITIVE COCCI        GRAM STAIN 4+  GRAM NEGATIVE RODS        Culture result: PENDING    LACTIC ACID, PLASMA    Collection Time: 04/10/17 11:55 PM   Result Value Ref Range    Lactic acid 3.2 (HH) 0.4 - 2.0 MMOL/L   GLUCOSE, POC    Collection Time: 04/11/17  1:11 AM   Result Value Ref Range    Glucose (POC) 79 65 - 100 mg/dL    Performed by Tato Bañuelos    GLUCOSE, POC    Collection Time: 04/11/17  1:56 AM   Result Value Ref Range    Glucose (POC) 92 65 - 100 mg/dL    Performed by Tato Bañuelos    LACTIC ACID, PLASMA    Collection Time: 04/11/17  4:48 AM   Result Value Ref Range    Lactic acid 1.7 0.4 - 2.0 MMOL/L   GLUCOSE, POC    Collection Time: 04/11/17 12:25 PM   Result Value Ref Range    Glucose (POC) 99 65 - 100 mg/dL    Performed by Rafa Baum    GLUCOSE, POC Collection Time: 04/11/17  4:42 PM   Result Value Ref Range    Glucose (POC) 79 65 - 100 mg/dL    Performed by Rafa Baum    GLUCOSE, POC    Collection Time: 04/11/17  5:17 PM   Result Value Ref Range    Glucose (POC) 131 (H) 65 - 100 mg/dL    Performed by Ulises Nunez          Impression:     Left 5th toe abscess with osteomyelitis. Recommendation:     -Discussed with patient about amputation, and high predictability of the of the 5th toe osteomyelitis  -Will obtain MRI of the foot to see if 5th metatarsal is involved. -NPO after 10am for surgery on Wednesday afternoon.

## 2017-04-11 NOTE — PROGRESS NOTES
Spiritual Care Partner Volunteer visited patient in [facility] on 04/11/17. Documented by:    Jona Farmer M.S. MMichaelDiv.   [address] [place] [address] Aubrey ([id number])

## 2017-04-11 NOTE — ED NOTES
Patient given PO orange juice and crackers and peanut butter for POC glucose of 79. Updated patient on plan of care and medications (IV fluids and antibiotics to be given, verbalized understanding. Educated patient and sister on admission process, verbalized understanding. Call bell in reach, side rails x 2, sister at bedside.

## 2017-04-11 NOTE — PROGRESS NOTES
End of Shift Nursing Note    Bedside shift change report given to JR Colunga(oncoming nurse) by Jimenez Youssef (offgoing nurse). Report included the following information SBAR, Kardex, Intake/Output and Accordion. Zone Phone:   [phone]    Significant changes during shift:    NONE   Non-emergent issues for physician to address:   NONE     Number times ambulated in hallway past shift: 0      Number of times OOB to chair past shift: 3    POD #: NONE     Vital Signs:    Temp: 98.2 °F (36.8 °C)     Pulse (Heart Rate): 69     BP: 133/76     Resp Rate: 16     O2 Sat (%): 100 %    Lines & Drains:     Urinary Catheter? No   Placement Date:    Medical Necessity:   Central Line? No   Placement Date:    Medical Necessity:   PICC Line? No   Placement Date:    Medical Necessity:     NG tube [] in [] removed [x] not applicable   Drains [] in [] removed [x] not applicable     Skin Integrity:      Wounds: yes   Dressings Present: no    Wound Concerns: yes      GI:    Current diet:  DIET DIABETIC CONSISTENT CARB Regular    Nausea: NO  Vomiting: NO  Bowel Sounds: YES  Flatus: YES  Last Bowel Movement: several days ago   Appearance:     Respiratory:  Supplemental O2: No      Device:    via  Liters/min     Incentive Spirometer: YES  Volume:   Coughing and Deep Breathing: YES  Oral Care: YES  Understanding (patient/family education): YES   Getting out of bed: YES  Head of bed elevation: YES    Patient Safety:    Falls Score: 1  Mobility Score: 1  Bed Alarm On? No  Sitter? No      Opportunity for questions and clarification was given to oncoming nurse. Patient bed is in low position, side rails are up x 2, door & observation blinds open as needed, call bell within reach and patient not in distress.     Rani Quintana RN

## 2017-04-11 NOTE — ED NOTES
Updated patient and sister regarding admission process and when to expect an inpatient room, verbalized understanding. Resting in position of comfort with call bell in reach, sister at bedside, bed low, monitor x 2.

## 2017-04-11 NOTE — ED PROVIDER NOTES
HPI Comments: David Patel is a 46 y.o. male with h/o DM and neuropathy who presents ambulatory to the ED c/o an ulcer to his L 5th toe and swelling to his L foot x a couple days. Pt reports the ulcer starting as a blister which he noticed about a week ago after removing his boot after getting off work. He states the blister progressively worsening prompting him to report the ED for evaluation. Additionally, he states the swelling to he L foot worsened this afternoon. Pt denies currently being in any pain due to his neuropathy. He reports a history of a similar ulcer to the bottom of his R foot which did not require admission for IV-antibiotics. Pt states he discharged home on oral antibiotics and instructed to follow up with wound care. Pt specifically denies fever, chills, CP, SOB, abd pain, nausea, vomiting or diarrhea. PCP: Eulalia Zhong MD    Social Hx: +tobacco (1 PPD), +EtOH (5th of liquor per day), -Illicit Drugs      There are no other complaints, changes or physical findings at this time. The history is provided by the patient. Past Medical History:   Diagnosis Date    Hyperlipidemia LDL goal <100     Type 2 diabetes mellitus ([facility] [address]     with neuropathy       History reviewed. No pertinent surgical history. Family History:   Problem Relation Age of Onset    Diabetes Mother     Heart Disease Mother      cabg    Diabetes Father     Heart Disease Father      cabg    MS Paternal Grandfather        Social History     Social History    Marital status: SINGLE     Spouse name: N/A    Number of children: N/A    Years of education: N/A     Occupational History    Not on file.      Social History Main Topics    Smoking status: Current Every Day Smoker     Packs/day: 1.00     Years: 35.00     Types: Cigarettes    Smokeless tobacco: Not on file    Alcohol use 0.0 oz/week     0 Standard drinks or equivalent per week      Comment: pt reports fifth of bourbon 4-5 days a week    Drug use: No    Sexual activity: Not on file     Other Topics Concern    Not on file     Social History Narrative    Lives in [place] with his girlfriend of 21 years and 33 yo daughter and her boyfriend and 7 yo granddaughter and his 22 yo son. Also has another daughter who does live at home. Works as a . Likes to ride motorcycles. ALLERGIES: Review of patient's allergies indicates no known allergies. Review of Systems   Constitutional: Negative. Negative for chills and fever. HENT: Negative. Eyes: Negative. Respiratory: Negative. Negative for shortness of breath. Cardiovascular: Negative. Negative for chest pain. Gastrointestinal: Negative. Negative for abdominal pain, diarrhea, nausea and vomiting. Genitourinary: Negative. Musculoskeletal:        +Swelling to L foot   Skin:        +ulcer to L fifth toe   Neurological: Negative. All other systems reviewed and are negative. Patient Vitals for the past 12 hrs:   Temp Pulse Resp BP SpO2   04/10/17 2013 97.9 °F (36.6 °C) (!) 106 16 149/75 98 %     Physical Exam   Constitutional: He is oriented to person, place, and time. He appears well-developed and well-nourished. No distress. 45 yo  male   HENT:   Head: Normocephalic and atraumatic. Eyes: Conjunctivae are normal. Right eye exhibits no discharge. Left eye exhibits no discharge. Neck: Normal range of motion. Neck supple. Cardiovascular: Normal rate, regular rhythm, normal heart sounds and intact distal pulses. No murmur heard. Pulmonary/Chest: Effort normal and breath sounds normal. No respiratory distress. He has no wheezes. Musculoskeletal:   L 5th TOE with necrotic appearing ulcer. Scant amt of foul smelling drainage between the 4th and 5th toes. Non-tender to palpation. R FOOT: Thickened, dark callous to the plantar aspect of the ball of the foot. Ambulatory without difficulty. Lymphadenopathy:     He has no cervical adenopathy. Neurological: He is alert and oriented to person, place, and time. Skin: Skin is warm and dry. He is not diaphoretic. Psychiatric: He has a normal mood and affect. His behavior is normal.   Nursing note and vitals reviewed. MDM  Number of Diagnoses or Management Options  Diagnosis management comments:   DDx; diabetic ulcer, peripheral neuropathy, cellulitis, osteomyelitis       Amount and/or Complexity of Data Reviewed  Clinical lab tests: ordered and reviewed  Tests in the radiology section of CPT®: ordered and reviewed  Review and summarize past medical records: yes    Patient Progress  Patient progress: stable    ED Course       Procedures     The patient was counseled on the dangers of tobacco use, and was advised to quit. Reviewed strategies to maximize success, including removing cigarettes and smoking materials from environment. SIGN OUT:  12:28 AM  Patient's presentation, labs/imaging and plan of care was reviewed with Shannon Stuart MD as part of sign out. They will review all available labs once they have resulted as part of the plan discussed with the patient. Shannon Stuart MD's assistance in completion of this plan is greatly appreciated but it should be noted that I will be the provider of record for this patient. EVELIO Molina     CONSULT NOTE:   12:40 AM  Maria Dolores Hope MD spoke with Bhupendra Masters MD   Specialty: Hospitalist  Discussed pt's hx, disposition, and available diagnostic and imaging results. Reviewed care plans. Consultant will evaluate pt for admission. Written by Iliana Aviles, ED Scribe, as dictated by Shannon Stuart MD.    PROGRESS NOTE:  12:41 AM  Pt has been re-evaluated. Pt lactate resulted at 3.2 Will order bolus to complete 30 ml per kilo bolus.   Written by Iliana Aviles, ED scribe, as dictated by Shannon Meléndez MD    LABORATORY TESTS:  Recent Results (from the past 12 hour(s))   CBC WITH AUTOMATED DIFF    Collection Time: 04/10/17 11:55 PM Result Value Ref Range    WBC 9.3 4.1 - 11.1 K/uL    RBC 4.44 4.10 - 5.70 M/uL    HGB 13.6 12.1 - 17.0 g/dL    HCT 41.1 36.6 - 50.3 %    MCV 92.6 80.0 - 99.0 FL    MCH 30.6 26.0 - 34.0 PG    MCHC 33.1 30.0 - 36.5 g/dL    RDW 13.0 11.5 - 14.5 %    PLATELET 240 349 - 191 K/uL    NEUTROPHILS 58 32 - 75 %    LYMPHOCYTES 32 12 - 49 %    MONOCYTES 5 5 - 13 %    EOSINOPHILS 4 0 - 7 %    BASOPHILS 1 0 - 1 %    ABS. NEUTROPHILS 5.5 1.8 - 8.0 K/UL    ABS. LYMPHOCYTES 3.0 0.8 - 3.5 K/UL    ABS. MONOCYTES 0.4 0.0 - 1.0 K/UL    ABS. EOSINOPHILS 0.3 0.0 - 0.4 K/UL    ABS. BASOPHILS 0.1 0.0 - 0.1 K/UL   METABOLIC PANEL, COMPREHENSIVE    Collection Time: 04/10/17 11:55 PM   Result Value Ref Range    Sodium 145 136 - 145 mmol/L    Potassium 4.0 3.5 - 5.1 mmol/L    Chloride 109 (H) 97 - 108 mmol/L    CO2 23 21 - 32 mmol/L    Anion gap 13 5 - 15 mmol/L    Glucose 84 65 - 100 mg/dL    BUN 11 6 - 20 MG/DL    Creatinine 0.66 (L) 0.70 - 1.30 MG/DL    BUN/Creatinine ratio 17 12 - 20      GFR est AA >60 >60 ml/min/1.73m2    GFR est non-AA >60 >60 ml/min/1.73m2    Calcium 8.7 8.5 - 10.1 MG/DL    Bilirubin, total 0.1 (L) 0.2 - 1.0 MG/DL    ALT (SGPT) 19 12 - 78 U/L    AST (SGOT) 9 (L) 15 - 37 U/L    Alk. phosphatase 78 45 - 117 U/L    Protein, total 7.4 6.4 - 8.2 g/dL    Albumin 3.2 (L) 3.5 - 5.0 g/dL    Globulin 4.2 (H) 2.0 - 4.0 g/dL    A-G Ratio 0.8 (L) 1.1 - 2.2     LACTIC ACID, PLASMA    Collection Time: 04/10/17 11:55 PM   Result Value Ref Range    Lactic acid 3.2 (HH) 0.4 - 2.0 MMOL/L       IMAGING RESULTS:  XR FOOT LT MIN 3 V   Final Result   EXAM: XR FOOT LT MIN 3 V     INDICATION: Trauma. HISTORY (per electronic medical record): Left fifth toe swelling and redness for  one week. Wound between the fourth and fifth left toes with purulent discharge. History of diabetes.     COMPARISON: 10/11/2016.     FINDINGS: Three views of the left foot demonstrate fifth digit soft tissue  swelling.  There is an angulated and mildly displaced fracture of the fifth  proximal phalangeal neck. There is focal osteopenia along the medial aspects of  the fifth metatarsal head and fifth proximal phalangeal base, though this is a  common location for focal osteopenia and does not necessarily mean  osteomyelitis. This is symmetric compared to right foot radiographs from  10/11/2016.      IMPRESSION  IMPRESSION: Fifth proximal phalangeal neck fracture. See discussion above. MEDICATIONS GIVEN:  Medications   sodium chloride 0.9 % bolus infusion 1,000 mL (1,000 mL IntraVENous New Bag 4/10/17 6410)   sodium chloride (NS) flush 5-10 mL (10 mL IntraVENous Given 4/10/17 1102)   sodium chloride (NS) flush 5-10 mL (not administered)   piperacillin-tazobactam (ZOSYN) 3.375 g in 0.9% sodium chloride (MBP/ADV) 100 mL (not administered)   vancomycin (VANCOCIN) 2,132.5 mg in 0.9% sodium chloride 500 mL IVPB (not administered)   sodium chloride (NS) flush 5-10 mL (not administered)   sodium chloride (NS) flush 5-10 mL (not administered)   sodium chloride 0.9 % bolus infusion 1,000 mL (not administered)   piperacillin-tazobactam (ZOSYN) 3.375 g in 0.9% sodium chloride (MBP/ADV) 100 mL (not administered)   vancomycin (VANCOCIN) 2000 mg in  ml infusion (not administered)   sodium chloride 0.9 % bolus infusion 1,600 mL (not administered)       IMPRESSION:  1. Osteomyelitis of toe of left foot ([address]    2. Diabetic foot infection ([address]    3. Sepsis, due to unspecified organism ([address]    4. Tobacco abuse        PLAN:  1. Admit to hospitalist    ADMIT NOTE:  12:41 AM  Patient is being admitted to the hospital.  The results of their tests and reasons for their admission have been discussed with them and/or available family. They convey agreement and understanding for the need to be admitted and for their admission diagnosis. Consultation has been made with the inpatient physician specialist for hospitalization.     This note is prepared by [facility] [employer], acting as Scribe for Jamison Quezada MD.    Jamison Quezada MD: The scribe's documentation has been prepared under my direction and personally reviewed by me in its entirety. I confirm that the note above accurately reflects all work, treatment, procedures, and medical decision making performed by me.

## 2017-04-11 NOTE — H&P
Hospitalist Admission Note    NAME: Keyshawn Martinez   :  1965   MRN:  [id number]     Date/Time:  2017 5:33 AM    Patient PCP: Chiki Fonseca MD  ________________________________________________________________________    My assessment of this patient's clinical condition and my plan of care is as follows. Assessment / Plan:  Sepsis with sinus tach and lactic acidosis  Due to Injury of L 5th toe, infected   Admit  Concern for osteo as hard to see osteal changes with fracture  Check MRI foot  Podiatry consult  IV vancomycin and Zosyn  F/u wound and blood cultures  Serial lactic acid  IVF  XRay L foot showed Fifth proximal phalangeal neck fracture    Type 2 diabetes, controlled, with neuropathy  Hold metformin  SSI    Hyperlipidemia  Continue statins    Smoking  Counseled  Decline nicotine patch    Alcohol use, ? Abuse  CIWa with valium  Thiamine, MVI and folic acid    Code Status: full    DVT Prophylaxis: Lovenox  GI Prophylaxis: not indicated    Baseline: functional        Subjective:   CHIEF COMPLAINT: left 5th toe infection    HISTORY OF PRESENT ILLNESS:     Kahlil Davidson is a 46 y.o.  male who presents with left 5th toe infection. As per patient, for 3 days he is been having pain in left 5th toe and seems to got infected. He believe this is happened because of the shoe. Pt claims to have 7/10 pain, worsening with ambulation, constant, sharp in nature. For past 2 days he noted to have pus coming out of the wound. Pt denies any fever, chills, nausea, vomiting, diarrhea, chest pain, cough or shortness of breath. In ED Xray showed Fifth proximal phalangeal neck fracture. Pt also noted to have sinus tachycardia and lactic acidosis. We were asked to admit for work up and evaluation of the above problems.      Past Medical History:   Diagnosis Date    Hyperlipidemia LDL goal <100     Type 2 diabetes mellitus ([facility] [address]     with neuropathy        Surgical history: denies any abdominal surgeries    Social History   Substance Use Topics    Smoking status: Current Every Day Smoker     Packs/day: 1.00     Years: 35.00     Types: Cigarettes    Smokeless tobacco: Not on file    Alcohol use Claim heavy use but not tell details                   Family History   Problem Relation Age of Onset    Diabetes Mother     Heart Disease Mother      cabg    Diabetes Father     Heart Disease Father      cabg    MS Paternal Grandfather      No Known Allergies     Prior to Admission medications    Medication Sig Start Date End Date Taking? Authorizing Provider   sildenafil citrate (VIAGRA) 100 mg tablet Take 1 Tab by mouth as needed. 1/16/17   Wynn Siemens, MD   sildenafil (REVATIO) 20 mg tablet Take up to 5 tabs as needed prior to activity 1/16/17   Wynn Siemens, MD   atorvastatin (LIPITOR) 20 mg tablet Take  by mouth daily. Historical Provider   gabapentin (NEURONTIN) 300 mg capsule Take 300 mg by mouth three (3) times daily. Historical Provider   metFORMIN ER (GLUCOPHAGE XR) 500 mg tablet 1 tab twice daily 7/10/16   Historical Provider   MV-MN/FA/VIT K/LYCOP/LUT/COQ10 (DAILY MULTIVITAMIN PO) Take  by mouth. Historical Provider       REVIEW OF SYSTEMS:     I am not able to complete the review of systems because:    The patient is intubated and sedated    The patient has altered mental status due to his acute medical problems    The patient has baseline aphasia from prior stroke(s)    The patient has baseline dementia and is not reliable historian    The patient is in acute medical distress and unable to provide information           Total of 12 systems reviewed as follows:       POSITIVE= underlined text  Negative = text not underlined  General:  fever, chills, sweats, generalized weakness, weight loss/gain,      loss of appetite   Eyes:    blurred vision, eye pain, loss of vision, double vision  ENT:    rhinorrhea, pharyngitis   Respiratory:   cough, sputum production, SOB, SETH, wheezing, pleuritic pain   Cardiology:   chest pain, palpitations, orthopnea, PND, edema, syncope   Gastrointestinal:  abdominal pain , N/V, diarrhea, dysphagia, constipation, bleeding   Genitourinary:  frequency, urgency, dysuria, hematuria, incontinence   Muskuloskeletal :  arthralgia, myalgia, back pain  Hematology:  easy bruising, nose or gum bleeding, lymphadenopathy   Dermatological: rash, ulceration/ wound on left 5 toe with pus, pruritis, color change / jaundice  Endocrine:   hot flashes or polydipsia   Neurological:  headache, dizziness, confusion, focal weakness, paresthesia,     Speech difficulties, memory loss, gait difficulty  Psychological: Feelings of anxiety, depression, agitation    Objective:   VITALS:    Visit Vitals    /69    Pulse 97    Temp 97.9 °F (36.6 °C)    Resp 18    Ht 5' 8\" (1.727 m)    Wt 85.3 kg (188 lb 0.8 oz)    SpO2 97%    BMI 28.59 kg/m2       PHYSICAL EXAM:    General:    Alert, cooperative, no distress, appears stated age. HEENT: Atraumatic, anicteric sclerae, pink conjunctivae     No oral ulcers, mucosa moist, throat clear, dentition fair  Neck:  Supple, symmetrical,  thyroid: non tender  Lungs:   Clear to auscultation bilaterally. No Wheezing or Rhonchi. No rales. Chest wall:  No tenderness  No Accessory muscle use. Heart:   Regular  rhythm,  No  murmur   No edema  Abdomen:   Soft, non-tender. Not distended. Bowel sounds normal  Extremities: No cyanosis. No clubbing      Capillary refill normal,  Radial pulse 2+  Skin:     Left 5th toe infection with pus coming from medical aspect. Not pale. Not Jaundiced  No rashes   Psych:  Good insight. Not depressed. Not anxious or agitated. Neurologic: EOMs intact. No facial asymmetry. No aphasia or slurred speech. Symmetrical strength, Sensation grossly intact.  Alert and oriented X 4.     _______________________________________________________________________  Care Plan discussed with:    Comments   Patient y    Family RN y    Care Manager                    Consultant:  tia ED physician   _______________________________________________________________________  Expected  Disposition:   Home with Family y   HH/PT/OT/RN    SNF/LTC    LEONARDA    ________________________________________________________________________  TOTAL TIME: 72 Minutes including 3 minutes smoking cessation counseling    Critical Care Provided     Minutes non procedure based      Comments    y Reviewed previous records   >50% of visit spent in counseling and coordination of care y Discussion with patient and questions answered       ________________________________________________________________________  Signed: Gabino Duval MD    Procedures: see electronic medical records for all procedures/Xrays and details which were not copied into this note but were reviewed prior to creation of Plan. LAB DATA REVIEWED:    Recent Results (from the past 24 hour(s))   CBC WITH AUTOMATED DIFF    Collection Time: 04/10/17 11:55 PM   Result Value Ref Range    WBC 9.3 4.1 - 11.1 K/uL    RBC 4.44 4.10 - 5.70 M/uL    HGB 13.6 12.1 - 17.0 g/dL    HCT 41.1 36.6 - 50.3 %    MCV 92.6 80.0 - 99.0 FL    MCH 30.6 26.0 - 34.0 PG    MCHC 33.1 30.0 - 36.5 g/dL    RDW 13.0 11.5 - 14.5 %    PLATELET 787 828 - 041 K/uL    NEUTROPHILS 58 32 - 75 %    LYMPHOCYTES 32 12 - 49 %    MONOCYTES 5 5 - 13 %    EOSINOPHILS 4 0 - 7 %    BASOPHILS 1 0 - 1 %    ABS. NEUTROPHILS 5.5 1.8 - 8.0 K/UL    ABS. LYMPHOCYTES 3.0 0.8 - 3.5 K/UL    ABS. MONOCYTES 0.4 0.0 - 1.0 K/UL    ABS. EOSINOPHILS 0.3 0.0 - 0.4 K/UL    ABS.  BASOPHILS 0.1 0.0 - 0.1 K/UL   METABOLIC PANEL, COMPREHENSIVE    Collection Time: 04/10/17 11:55 PM   Result Value Ref Range    Sodium 145 136 - 145 mmol/L    Potassium 4.0 3.5 - 5.1 mmol/L    Chloride 109 (H) 97 - 108 mmol/L    CO2 23 21 - 32 mmol/L    Anion gap 13 5 - 15 mmol/L    Glucose 84 65 - 100 mg/dL    BUN 11 6 - 20 MG/DL    Creatinine 0.66 (L) 0.70 - 1.30 MG/DL    BUN/Creatinine ratio 17 12 - 20      GFR est AA >60 >60 ml/min/1.73m2    GFR est non-AA >60 >60 ml/min/1.73m2    Calcium 8.7 8.5 - 10.1 MG/DL    Bilirubin, total 0.1 (L) 0.2 - 1.0 MG/DL    ALT (SGPT) 19 12 - 78 U/L    AST (SGOT) 9 (L) 15 - 37 U/L    Alk.  phosphatase 78 45 - 117 U/L    Protein, total 7.4 6.4 - 8.2 g/dL    Albumin 3.2 (L) 3.5 - 5.0 g/dL    Globulin 4.2 (H) 2.0 - 4.0 g/dL    A-G Ratio 0.8 (L) 1.1 - 2.2     LACTIC ACID, PLASMA    Collection Time: 04/10/17 11:55 PM   Result Value Ref Range    Lactic acid 3.2 (HH) 0.4 - 2.0 MMOL/L   GLUCOSE, POC    Collection Time: 04/11/17  1:11 AM   Result Value Ref Range    Glucose (POC) 79 65 - 100 mg/dL    Performed by [employer]    GLUCOSE, POC    Collection Time: 04/11/17  1:56 AM   Result Value Ref Range    Glucose (POC) 92 65 - 100 mg/dL    Performed by [employer]    LACTIC ACID, PLASMA    Collection Time: 04/11/17  4:48 AM   Result Value Ref Range    Lactic acid 1.7 0.4 - 2.0 MMOL/L

## 2017-04-11 NOTE — ED NOTES
Bedside and Verbal shift change report given to Lesli Rees RN (oncoming nurse) by Annmarie Goldstein RN (offgoing nurse). Report included the following information SBAR, ED Summary, MAR and Recent Results.

## 2017-04-11 NOTE — DIABETES MGMT
DTC Progress Note    Recommendations/ Comments: Noted pt hypoglycemic on admission. When appropriate, may consider resuming pt home medication Metformin ER 500mg bid. Chart reviewed on Natali Pacheco for hypoglycemia. Patient is a 46 y.o. male with known history of Type 2 Diabetes on Metformin ER 500mg bid at home. A1c:   Lab Results   Component Value Date/Time    Hemoglobin A1c 5.8 01/09/2017 09:16 AM    Hemoglobin A1c 6.4 07/18/2016 09:53 AM       Recent Glucose Results:   Lab Results   Component Value Date/Time    GLU 84 04/10/2017 11:55 PM    GLUCPOC 92 04/11/2017 01:56 AM    GLUCPOC 79 04/11/2017 01:11 AM        Lab Results   Component Value Date/Time    Creatinine 0.66 04/10/2017 11:55 PM       Active Orders   Diet    DIET DIABETIC CONSISTENT CARB Regular        PO intake: No data found. Current hospital DM medication:   -Humalog normal sensitivity correction    Will continue to follow as needed.     Thank you    You Garcia, [address]  Office: [phone]

## 2017-04-11 NOTE — PROGRESS NOTES
Primary Nurse Annemarie Fajardo RN and JR Urena performed a dual skin assessment on this patient Impairment noted- see wound doc flow sheet  Austin score is 21

## 2017-04-11 NOTE — ED NOTES
Pt presents to ED with c/o wound to L foot x 1 week. Wound noted in between 4th and 5th toes to L foot. Pt reports worsening of wound over the last 24-48 hours. Pt reports PMH of DM and states he is unsure of what his BG typically is as he does not regularly check his blood sugar. Redness noted to L lower leg and foot and L foot warm to the touch. Purulent discharge noted to wound.

## 2017-04-11 NOTE — CDMP QUERY
This patient has been diagnosed with Sepsis. Please document in the progress notes the clinical indicators that support this diagnosis or state that the diagnosis has been ruled out. Current Documentation: Sepsis with sinus tach and lactic acidosis  Due to Injury of L 5th toe, infected   Concern for osteo as hard to see osteal changes with fracture  Wbc 9.3, hr 97--106, lactic 3.2    Sepsis indicators include:   Documented Source of suspected or possible infection in addition to 2 of more of the following:   Temperature >38C or <36C    Heart Rate >90/min    Respiratory Rate >20/min or PaCO2 <32 mm Hg    WBC >12,000/mm3 or <4000/mm3 or >10% immature bands     Severe sepsis   Sepsis w/ evidence of end?organ damage   Lactic acid > 2.0    Septic shock   Sepsis refractory to fluid resuscitation (requiring pressors)   Lactic acid > 4.0    Please clarify and document your clinical opinion in the progress notes and discharge summary including the definitive and/or presumptive diagnosis, (suspected or probable), related to the above clinical findings. Please include clinical findings supporting your diagnosis.   Thanks,  Ishan Cobos RN/CDMP

## 2017-04-11 NOTE — PROGRESS NOTES
Pt was admitted for osteomyelitis of toe of left foot. Pt was alert and oriented, upon CM room visit. Pt reported that he resides with family in a one story home (6 steps into main entrance). Pt reported that he is independent with ADLs/IADLs, and he still drives. Pt reported that he is active with PCP, and he uses [facility]. Pt reported no DME, HH/SNF. Pt will have transportation, at d/c. CM will continue to follow up with pt, and make referrals as deemed necessary. Care Management Interventions  PCP Verified by CM: Yes  Mode of Transport at Discharge: Other (see comment)  Transition of Care Consult (CM Consult): Discharge Planning  Discharge Durable Medical Equipment: No  Physical Therapy Consult: No  Occupational Therapy Consult: No  Speech Therapy Consult: No  Current Support Network:  Other, Own Home  Plan discussed with Pt/Family/Caregiver: Yes  Discharge Location  Discharge Placement: [address], MSW   [phone]

## 2017-04-11 NOTE — ED NOTES
TRANSFER - OUT REPORT:    Verbal report given to Lexi Rebollar on Kandy Roland  being transferred to Gen Surg(unit) for routine progression of care       Report consisted of patients Situation, Background, Assessment and   Recommendations(SBAR). Information from the following report(s) SBAR, Kardex, ED Summary, Intake/Output and MAR was reviewed with the receiving nurse. Lines:   Peripheral IV 04/10/17 Right; Inner Antecubital (Active)   Site Assessment Clean, dry, & intact 4/10/2017 11:54 PM   Phlebitis Assessment 0 4/10/2017 11:54 PM   Infiltration Assessment 0 4/10/2017 11:54 PM   Dressing Status Clean, dry, & intact; New 4/10/2017 11:54 PM   Dressing Type Transparent 4/10/2017 11:54 PM   Hub Color/Line Status Pink 4/10/2017 11:54 PM        Opportunity for questions and clarification was provided.       Patient transported with:   [employer]

## 2017-04-12 ENCOUNTER — ANESTHESIA (OUTPATIENT)
Dept: SURGERY | Age: 52
DRG: 504 | End: 2017-04-12
Payer: COMMERCIAL

## 2017-04-12 ENCOUNTER — ANESTHESIA EVENT (OUTPATIENT)
Dept: SURGERY | Age: 52
DRG: 504 | End: 2017-04-12
Payer: COMMERCIAL

## 2017-04-12 ENCOUNTER — APPOINTMENT (OUTPATIENT)
Dept: GENERAL RADIOLOGY | Age: 52
DRG: 504 | End: 2017-04-12
Attending: PODIATRIST
Payer: COMMERCIAL

## 2017-04-12 LAB
ALBUMIN SERPL BCP-MCNC: 2.9 G/DL (ref 3.5–5)
ALBUMIN/GLOB SERPL: 0.7 {RATIO} (ref 1.1–2.2)
ALP SERPL-CCNC: 70 U/L (ref 45–117)
ALT SERPL-CCNC: 17 U/L (ref 12–78)
ANION GAP BLD CALC-SCNC: 9 MMOL/L (ref 5–15)
AST SERPL W P-5'-P-CCNC: 10 U/L (ref 15–37)
BASOPHILS # BLD AUTO: 0.1 K/UL (ref 0–0.1)
BASOPHILS # BLD: 1 % (ref 0–1)
BILIRUB SERPL-MCNC: 0.3 MG/DL (ref 0.2–1)
BUN SERPL-MCNC: 13 MG/DL (ref 6–20)
BUN/CREAT SERPL: 19 (ref 12–20)
CALCIUM SERPL-MCNC: 8.7 MG/DL (ref 8.5–10.1)
CHLORIDE SERPL-SCNC: 107 MMOL/L (ref 97–108)
CO2 SERPL-SCNC: 25 MMOL/L (ref 21–32)
CREAT SERPL-MCNC: 0.69 MG/DL (ref 0.7–1.3)
DATE LAST DOSE: NORMAL
EOSINOPHIL # BLD: 0.3 K/UL (ref 0–0.4)
EOSINOPHIL NFR BLD: 3 % (ref 0–7)
ERYTHROCYTE [DISTWIDTH] IN BLOOD BY AUTOMATED COUNT: 13 % (ref 11.5–14.5)
GLOBULIN SER CALC-MCNC: 4 G/DL (ref 2–4)
GLUCOSE BLD STRIP.AUTO-MCNC: 83 MG/DL (ref 65–100)
GLUCOSE BLD STRIP.AUTO-MCNC: 85 MG/DL (ref 65–100)
GLUCOSE BLD STRIP.AUTO-MCNC: 88 MG/DL (ref 65–100)
GLUCOSE BLD STRIP.AUTO-MCNC: 91 MG/DL (ref 65–100)
GLUCOSE BLD STRIP.AUTO-MCNC: 98 MG/DL (ref 65–100)
GLUCOSE SERPL-MCNC: 81 MG/DL (ref 65–100)
HCT VFR BLD AUTO: 39.2 % (ref 36.6–50.3)
HGB BLD-MCNC: 12.9 G/DL (ref 12.1–17)
LYMPHOCYTES # BLD AUTO: 25 % (ref 12–49)
LYMPHOCYTES # BLD: 2.5 K/UL (ref 0.8–3.5)
MCH RBC QN AUTO: 30.6 PG (ref 26–34)
MCHC RBC AUTO-ENTMCNC: 32.9 G/DL (ref 30–36.5)
MCV RBC AUTO: 92.9 FL (ref 80–99)
MONOCYTES # BLD: 0.6 K/UL (ref 0–1)
MONOCYTES NFR BLD AUTO: 6 % (ref 5–13)
NEUTS SEG # BLD: 6.5 K/UL (ref 1.8–8)
NEUTS SEG NFR BLD AUTO: 65 % (ref 32–75)
PLATELET # BLD AUTO: 263 K/UL (ref 150–400)
POTASSIUM SERPL-SCNC: 4.1 MMOL/L (ref 3.5–5.1)
PROT SERPL-MCNC: 6.9 G/DL (ref 6.4–8.2)
RBC # BLD AUTO: 4.22 M/UL (ref 4.1–5.7)
REPORTED DOSE,DOSE: NORMAL UNITS
REPORTED DOSE/TIME,TMG: NORMAL
SERVICE CMNT-IMP: NORMAL
SODIUM SERPL-SCNC: 141 MMOL/L (ref 136–145)
VANCOMYCIN TROUGH SERPL-MCNC: 8.1 UG/ML (ref 5–10)
WBC # BLD AUTO: 9.9 K/UL (ref 4.1–11.1)

## 2017-04-12 PROCEDURE — 74011000250 HC RX REV CODE- 250: Performed by: PODIATRIST

## 2017-04-12 PROCEDURE — 87186 SC STD MICRODIL/AGAR DIL: CPT | Performed by: INTERNAL MEDICINE

## 2017-04-12 PROCEDURE — 36415 COLL VENOUS BLD VENIPUNCTURE: CPT | Performed by: INTERNAL MEDICINE

## 2017-04-12 PROCEDURE — 77030011992 HC AIRWY NASOPHGL TELE -A: Performed by: ANESTHESIOLOGY

## 2017-04-12 PROCEDURE — 0Y6Y0Z0 DETACHMENT AT LEFT 5TH TOE, COMPLETE, OPEN APPROACH: ICD-10-PCS | Performed by: PSYCHIATRY & NEUROLOGY

## 2017-04-12 PROCEDURE — 77030011640 HC PAD GRND REM COVD -A: Performed by: PODIATRIST

## 2017-04-12 PROCEDURE — 74011000258 HC RX REV CODE- 258: Performed by: EMERGENCY MEDICINE

## 2017-04-12 PROCEDURE — 87075 CULTR BACTERIA EXCEPT BLOOD: CPT | Performed by: INTERNAL MEDICINE

## 2017-04-12 PROCEDURE — 76010000138 HC OR TIME 0.5 TO 1 HR: Performed by: PODIATRIST

## 2017-04-12 PROCEDURE — 74011000250 HC RX REV CODE- 250

## 2017-04-12 PROCEDURE — 93005 ELECTROCARDIOGRAM TRACING: CPT

## 2017-04-12 PROCEDURE — 88305 TISSUE EXAM BY PATHOLOGIST: CPT | Performed by: PODIATRIST

## 2017-04-12 PROCEDURE — 65270000029 HC RM PRIVATE

## 2017-04-12 PROCEDURE — 88311 DECALCIFY TISSUE: CPT | Performed by: PODIATRIST

## 2017-04-12 PROCEDURE — 74011250636 HC RX REV CODE- 250/636

## 2017-04-12 PROCEDURE — 85025 COMPLETE CBC W/AUTO DIFF WBC: CPT | Performed by: INTERNAL MEDICINE

## 2017-04-12 PROCEDURE — 71020 XR CHEST PA LAT: CPT

## 2017-04-12 PROCEDURE — 77030018836 HC SOL IRR NACL ICUM -A: Performed by: PODIATRIST

## 2017-04-12 PROCEDURE — 80202 ASSAY OF VANCOMYCIN: CPT | Performed by: EMERGENCY MEDICINE

## 2017-04-12 PROCEDURE — 77030002916 HC SUT ETHLN J&J -A: Performed by: PODIATRIST

## 2017-04-12 PROCEDURE — 76210000006 HC OR PH I REC 0.5 TO 1 HR: Performed by: PODIATRIST

## 2017-04-12 PROCEDURE — 87205 SMEAR GRAM STAIN: CPT | Performed by: INTERNAL MEDICINE

## 2017-04-12 PROCEDURE — 74011250637 HC RX REV CODE- 250/637: Performed by: INTERNAL MEDICINE

## 2017-04-12 PROCEDURE — 76060000032 HC ANESTHESIA 0.5 TO 1 HR: Performed by: PODIATRIST

## 2017-04-12 PROCEDURE — 80053 COMPREHEN METABOLIC PANEL: CPT | Performed by: INTERNAL MEDICINE

## 2017-04-12 PROCEDURE — 74011250636 HC RX REV CODE- 250/636: Performed by: ANESTHESIOLOGY

## 2017-04-12 PROCEDURE — 82962 GLUCOSE BLOOD TEST: CPT

## 2017-04-12 PROCEDURE — 74011250636 HC RX REV CODE- 250/636: Performed by: INTERNAL MEDICINE

## 2017-04-12 PROCEDURE — 74011250636 HC RX REV CODE- 250/636: Performed by: EMERGENCY MEDICINE

## 2017-04-12 PROCEDURE — 87077 CULTURE AEROBIC IDENTIFY: CPT | Performed by: INTERNAL MEDICINE

## 2017-04-12 RX ORDER — BUPIVACAINE HYDROCHLORIDE 5 MG/ML
30 INJECTION, SOLUTION EPIDURAL; INTRACAUDAL ONCE
Status: COMPLETED | OUTPATIENT
Start: 2017-04-12 | End: 2017-04-12

## 2017-04-12 RX ORDER — SODIUM CHLORIDE 0.9 % (FLUSH) 0.9 %
5-10 SYRINGE (ML) INJECTION AS NEEDED
Status: DISCONTINUED | OUTPATIENT
Start: 2017-04-12 | End: 2017-04-12 | Stop reason: HOSPADM

## 2017-04-12 RX ORDER — SODIUM CHLORIDE, SODIUM LACTATE, POTASSIUM CHLORIDE, CALCIUM CHLORIDE 600; 310; 30; 20 MG/100ML; MG/100ML; MG/100ML; MG/100ML
25 INJECTION, SOLUTION INTRAVENOUS CONTINUOUS
Status: DISCONTINUED | OUTPATIENT
Start: 2017-04-12 | End: 2017-04-12 | Stop reason: HOSPADM

## 2017-04-12 RX ORDER — SODIUM CHLORIDE 0.9 % (FLUSH) 0.9 %
5-10 SYRINGE (ML) INJECTION AS NEEDED
Status: DISCONTINUED | OUTPATIENT
Start: 2017-04-12 | End: 2017-04-13 | Stop reason: HOSPADM

## 2017-04-12 RX ORDER — PROPOFOL 10 MG/ML
INJECTION, EMULSION INTRAVENOUS AS NEEDED
Status: DISCONTINUED | OUTPATIENT
Start: 2017-04-12 | End: 2017-04-12 | Stop reason: HOSPADM

## 2017-04-12 RX ORDER — HYDROMORPHONE HYDROCHLORIDE 1 MG/ML
.2-.5 INJECTION, SOLUTION INTRAMUSCULAR; INTRAVENOUS; SUBCUTANEOUS
Status: DISCONTINUED | OUTPATIENT
Start: 2017-04-12 | End: 2017-04-12 | Stop reason: HOSPADM

## 2017-04-12 RX ORDER — PROPOFOL 10 MG/ML
INJECTION, EMULSION INTRAVENOUS
Status: DISCONTINUED | OUTPATIENT
Start: 2017-04-12 | End: 2017-04-12 | Stop reason: HOSPADM

## 2017-04-12 RX ORDER — LIDOCAINE HYDROCHLORIDE 20 MG/ML
INJECTION, SOLUTION EPIDURAL; INFILTRATION; INTRACAUDAL; PERINEURAL AS NEEDED
Status: DISCONTINUED | OUTPATIENT
Start: 2017-04-12 | End: 2017-04-12 | Stop reason: HOSPADM

## 2017-04-12 RX ORDER — SODIUM CHLORIDE, SODIUM LACTATE, POTASSIUM CHLORIDE, CALCIUM CHLORIDE 600; 310; 30; 20 MG/100ML; MG/100ML; MG/100ML; MG/100ML
INJECTION, SOLUTION INTRAVENOUS
Status: DISCONTINUED | OUTPATIENT
Start: 2017-04-12 | End: 2017-04-12 | Stop reason: HOSPADM

## 2017-04-12 RX ORDER — DIPHENHYDRAMINE HYDROCHLORIDE 50 MG/ML
12.5 INJECTION, SOLUTION INTRAMUSCULAR; INTRAVENOUS
Status: DISCONTINUED | OUTPATIENT
Start: 2017-04-12 | End: 2017-04-12 | Stop reason: HOSPADM

## 2017-04-12 RX ORDER — FENTANYL CITRATE 50 UG/ML
INJECTION, SOLUTION INTRAMUSCULAR; INTRAVENOUS AS NEEDED
Status: DISCONTINUED | OUTPATIENT
Start: 2017-04-12 | End: 2017-04-12 | Stop reason: HOSPADM

## 2017-04-12 RX ORDER — ENOXAPARIN SODIUM 100 MG/ML
40 INJECTION SUBCUTANEOUS EVERY 24 HOURS
Status: DISCONTINUED | OUTPATIENT
Start: 2017-04-13 | End: 2017-04-13 | Stop reason: HOSPADM

## 2017-04-12 RX ORDER — LIDOCAINE HYDROCHLORIDE 10 MG/ML
0.1 INJECTION, SOLUTION EPIDURAL; INFILTRATION; INTRACAUDAL; PERINEURAL AS NEEDED
Status: DISCONTINUED | OUTPATIENT
Start: 2017-04-12 | End: 2017-04-13 | Stop reason: HOSPADM

## 2017-04-12 RX ORDER — MORPHINE SULFATE 10 MG/ML
2 INJECTION, SOLUTION INTRAMUSCULAR; INTRAVENOUS
Status: DISCONTINUED | OUTPATIENT
Start: 2017-04-12 | End: 2017-04-12 | Stop reason: HOSPADM

## 2017-04-12 RX ORDER — MIDAZOLAM HYDROCHLORIDE 1 MG/ML
INJECTION, SOLUTION INTRAMUSCULAR; INTRAVENOUS AS NEEDED
Status: DISCONTINUED | OUTPATIENT
Start: 2017-04-12 | End: 2017-04-12 | Stop reason: HOSPADM

## 2017-04-12 RX ORDER — SODIUM CHLORIDE, SODIUM LACTATE, POTASSIUM CHLORIDE, CALCIUM CHLORIDE 600; 310; 30; 20 MG/100ML; MG/100ML; MG/100ML; MG/100ML
25 INJECTION, SOLUTION INTRAVENOUS CONTINUOUS
Status: DISCONTINUED | OUTPATIENT
Start: 2017-04-12 | End: 2017-04-13 | Stop reason: HOSPADM

## 2017-04-12 RX ORDER — FENTANYL CITRATE 50 UG/ML
25 INJECTION, SOLUTION INTRAMUSCULAR; INTRAVENOUS
Status: DISCONTINUED | OUTPATIENT
Start: 2017-04-12 | End: 2017-04-12 | Stop reason: HOSPADM

## 2017-04-12 RX ORDER — SODIUM CHLORIDE 0.9 % (FLUSH) 0.9 %
5-10 SYRINGE (ML) INJECTION EVERY 8 HOURS
Status: DISCONTINUED | OUTPATIENT
Start: 2017-04-12 | End: 2017-04-13 | Stop reason: HOSPADM

## 2017-04-12 RX ADMIN — MIDAZOLAM HYDROCHLORIDE 2 MG: 1 INJECTION, SOLUTION INTRAMUSCULAR; INTRAVENOUS at 18:11

## 2017-04-12 RX ADMIN — FENTANYL CITRATE 50 MCG: 50 INJECTION, SOLUTION INTRAMUSCULAR; INTRAVENOUS at 18:25

## 2017-04-12 RX ADMIN — SODIUM CHLORIDE, SODIUM LACTATE, POTASSIUM CHLORIDE, AND CALCIUM CHLORIDE 25 ML/HR: 600; 310; 30; 20 INJECTION, SOLUTION INTRAVENOUS at 17:28

## 2017-04-12 RX ADMIN — PIPERACILLIN SODIUM,TAZOBACTAM SODIUM 3.38 G: 3; .375 INJECTION, POWDER, FOR SOLUTION INTRAVENOUS at 23:04

## 2017-04-12 RX ADMIN — Medication 10 ML: at 23:05

## 2017-04-12 RX ADMIN — PROPOFOL 30 MG: 10 INJECTION, EMULSION INTRAVENOUS at 18:25

## 2017-04-12 RX ADMIN — PROPOFOL 30 MG: 10 INJECTION, EMULSION INTRAVENOUS at 18:35

## 2017-04-12 RX ADMIN — GABAPENTIN 300 MG: 300 CAPSULE ORAL at 23:04

## 2017-04-12 RX ADMIN — FENTANYL CITRATE 50 MCG: 50 INJECTION, SOLUTION INTRAMUSCULAR; INTRAVENOUS at 18:18

## 2017-04-12 RX ADMIN — PROPOFOL 30 MG: 10 INJECTION, EMULSION INTRAVENOUS at 18:19

## 2017-04-12 RX ADMIN — Medication 10 ML: at 23:06

## 2017-04-12 RX ADMIN — ATORVASTATIN CALCIUM 20 MG: 20 TABLET, FILM COATED ORAL at 09:47

## 2017-04-12 RX ADMIN — VANCOMYCIN HYDROCHLORIDE 1250 MG: 10 INJECTION, POWDER, LYOPHILIZED, FOR SOLUTION INTRAVENOUS at 02:16

## 2017-04-12 RX ADMIN — Medication 10 ML: at 16:21

## 2017-04-12 RX ADMIN — MULTIPLE VITAMINS W/ MINERALS TAB 1 TABLET: TAB at 09:47

## 2017-04-12 RX ADMIN — PROPOFOL 30 MG: 10 INJECTION, EMULSION INTRAVENOUS at 18:34

## 2017-04-12 RX ADMIN — Medication 100 MG: at 09:47

## 2017-04-12 RX ADMIN — GABAPENTIN 300 MG: 300 CAPSULE ORAL at 09:47

## 2017-04-12 RX ADMIN — PIPERACILLIN SODIUM,TAZOBACTAM SODIUM 3.38 G: 3; .375 INJECTION, POWDER, FOR SOLUTION INTRAVENOUS at 16:19

## 2017-04-12 RX ADMIN — PROPOFOL 100 MCG/KG/MIN: 10 INJECTION, EMULSION INTRAVENOUS at 18:22

## 2017-04-12 RX ADMIN — PROPOFOL 30 MG: 10 INJECTION, EMULSION INTRAVENOUS at 18:27

## 2017-04-12 RX ADMIN — FENTANYL CITRATE 50 MCG: 50 INJECTION, SOLUTION INTRAMUSCULAR; INTRAVENOUS at 18:21

## 2017-04-12 RX ADMIN — FENTANYL CITRATE 50 MCG: 50 INJECTION, SOLUTION INTRAMUSCULAR; INTRAVENOUS at 18:23

## 2017-04-12 RX ADMIN — MIDAZOLAM HYDROCHLORIDE 2 MG: 1 INJECTION, SOLUTION INTRAMUSCULAR; INTRAVENOUS at 18:20

## 2017-04-12 RX ADMIN — FENTANYL CITRATE 50 MCG: 50 INJECTION, SOLUTION INTRAMUSCULAR; INTRAVENOUS at 18:15

## 2017-04-12 RX ADMIN — SODIUM CHLORIDE, SODIUM LACTATE, POTASSIUM CHLORIDE, CALCIUM CHLORIDE: 600; 310; 30; 20 INJECTION, SOLUTION INTRAVENOUS at 18:15

## 2017-04-12 RX ADMIN — VANCOMYCIN HYDROCHLORIDE 1250 MG: 10 INJECTION, POWDER, LYOPHILIZED, FOR SOLUTION INTRAVENOUS at 10:00

## 2017-04-12 RX ADMIN — MIDAZOLAM HYDROCHLORIDE 3 MG: 1 INJECTION, SOLUTION INTRAMUSCULAR; INTRAVENOUS at 18:28

## 2017-04-12 RX ADMIN — PIPERACILLIN SODIUM,TAZOBACTAM SODIUM 3.38 G: 3; .375 INJECTION, POWDER, FOR SOLUTION INTRAVENOUS at 05:59

## 2017-04-12 RX ADMIN — FOLIC ACID 1 MG: 1 TABLET ORAL at 09:47

## 2017-04-12 RX ADMIN — LIDOCAINE HYDROCHLORIDE 20 MG: 20 INJECTION, SOLUTION EPIDURAL; INFILTRATION; INTRACAUDAL; PERINEURAL at 18:17

## 2017-04-12 NOTE — PROGRESS NOTES
End of Shift Nursing Note    Bedside shift change report given to Aimee Dai RN (oncoming nurse) by Jaswant Heck RN (offgoing nurse). Report included the following information SBAR, Kardex, Intake/Output, MAR and Recent Results. Zone Phone:       Significant changes during shift:    Left toe amputation today. Non-emergent issues for physician to address:        Number times ambulated in hallway past shift: 0. Up in room. Number of times OOB to chair past shift: 1    POD #:      Vital Signs:    Temp: 98.2 °F (36.8 °C)     Pulse (Heart Rate): (!) 56     BP: 135/88     Resp Rate: 18     O2 Sat (%): 99 %    Lines & Drains:     Urinary Catheter? No   Placement Date:    Medical Necessity:   Central Line? No   Placement Date:    Medical Necessity:   PICC Line? No   Placement Date:    Medical Necessity:     NG tube [] in [] removed [] not applicable   Drains [] in [] removed [] not applicable     Skin Integrity:      Wounds: yes   Dressings Present: yes    Wound Concerns: yes      GI:    Current diet:  DIET DIABETIC CONSISTENT CARB Regular    Nausea: NO  Vomiting: NO  Bowel Sounds: YES  Flatus: YES  Last Bowel Movement: yesterday   Appearance:     Respiratory:  Supplemental O2: No      Device:    via  Liters/min     Incentive Spirometer: NO  Volume:   Coughing and Deep Breathing: YES  Oral Care: YES  Understanding (patient/family education): YES   Getting out of bed: YES  Head of bed elevation: YES    Patient Safety:    Falls Score: 1  Mobility Score: 0  Bed Alarm On? No  Sitter? No      Opportunity for questions and clarification was given to oncoming nurse. Patient bed is in 30 degree position, side rails are up x 2, door & observation blinds open as needed, call bell within reach and patient not in distress.     Toñito Yanez

## 2017-04-12 NOTE — ANESTHESIA PREPROCEDURE EVALUATION
Anesthetic History   No history of anesthetic complications            Review of Systems / Medical History  Patient summary reviewed, nursing notes reviewed and pertinent labs reviewed    Pulmonary  Within defined limits        Smoker         Neuro/Psych   Within defined limits           Cardiovascular  Within defined limits            Hyperlipidemia    Exercise tolerance: >4 METS     GI/Hepatic/Renal  Within defined limits              Endo/Other  Within defined limits  Diabetes: well controlled, type 2         Other Findings   Comments:  neuropathy           Physical Exam    Airway  Mallampati: III  TM Distance: 4 - 6 cm  Neck ROM: normal range of motion        Cardiovascular    Rhythm: regular  Rate: normal         Dental  No notable dental hx       Pulmonary  Breath sounds clear to auscultation               Abdominal  GI exam deferred       Other Findings            Anesthetic Plan    ASA: 2  Anesthesia type: MAC and total IV anesthesia    Monitoring Plan: BIS      Induction: Intravenous  Anesthetic plan and risks discussed with: Patient

## 2017-04-12 NOTE — PERIOP NOTES
Patient: Zhanna Ash MRN: [id number]  SSN: [id number]   YOB: 1965  Age: 46 y.o. Sex: male     Patient is status post Procedure(s):  LEFT FIFTH TOE AMPUTATION. Surgeon(s) and Role:     * Jerrica Real DPM - Primary    Local/Dose/Irrigation:                    Peripheral IV 04/10/17 Right; Inner Antecubital (Active)   Site Assessment Clean, dry, & intact 4/12/2017  5:00 PM   Phlebitis Assessment 0 4/12/2017  5:00 PM   Infiltration Assessment 0 4/12/2017  5:00 PM   Dressing Status Clean, dry, & intact 4/12/2017  5:00 PM   Dressing Type Tape;Transparent 4/12/2017  5:00 PM   Hub Color/Line Status Infusing;Pink 4/12/2017  5:00 PM       Peripheral IV 04/12/17 Right Hand (Active)   Site Assessment Clean, dry, & intact 4/12/2017  5:28 PM   Phlebitis Assessment 0 4/12/2017  5:28 PM   Infiltration Assessment 0 4/12/2017  5:28 PM   Dressing Status Clean, dry, & intact 4/12/2017  5:28 PM   Dressing Type Transparent 4/12/2017  5:28 PM   Hub Color/Line Status Pink 4/12/2017  5:28 PM                           Dressing/Packing:  Wound Foot Left-DRESSING TYPE: Other (Comment) (04/11/17 1100)  Wound Foot Left-DRESSING TYPE: 4 x 4;Gauze wrap (sierra); Sutures; Xeroform (04/12/17 1700)  Splint/Cast:  ]    Other:

## 2017-04-12 NOTE — PROGRESS NOTES
End of Shift Nursing Note    Bedside shift change report given to Jonathan Broussard RN (oncoming nurse) by Belinda Finch RN (offgoing nurse). Report included the following information SBAR, Kardex, Intake/Output, MAR and Accordion. Zone Phone:   [phone]    Significant changes during shift:    NPO at 10 am for surgery scheduled for 1700   Non-emergent issues for physician to address:        Number times ambulated in hallway past shift: 1      Number of times OOB to chair past shift: 1    POD #: day of surgery     Vital Signs:    Temp: 98 °F (36.7 °C)     Pulse (Heart Rate): 63     BP: 145/86     Resp Rate: 18     O2 Sat (%): 99 %    Lines & Drains:     Urinary Catheter? No     Central Line? No     PICC Line? No       NG tube [] in [] removed [x] not applicable   Drains [] in [] removed [x] not applicable     Skin Integrity:      Wounds: yes   Dressings Present: yes    Wound Concerns: no      GI:    Current diet:  DIET DIABETIC CONSISTENT CARB Regular    Nausea: NO  Vomiting: NO  Bowel Sounds: YES  Flatus: YES  Last Bowel Movement: yesterday   Appearance:     Respiratory:  Supplemental O2: No      Device:    via  Liters/min     Incentive Spirometer: NO (WE ARE OUT)  Volume:   Coughing and Deep Breathing: YES  Oral Care: YES  Understanding (patient/family education): YES   Getting out of bed: YES  Head of bed elevation: YES    Patient Safety:    Falls Score: 1  Mobility Score: 1  Bed Alarm On? No  Sitter? No      Opportunity for questions and clarification was given to oncoming nurse. Patient bed is in LOW position, side rails are up x 2, door & observation blinds open as needed, call bell within reach and patient not in distress.     Belinda Finch RN

## 2017-04-12 NOTE — BRIEF OP NOTE
BRIEF OPERATIVE NOTE    Date of Procedure: 4/12/2017   Preoperative Diagnosis: Toe  Postoperative Diagnosis: infected left fifth toe    Procedure(s):  LEFT FIFTH TOE AMPUTATION  Surgeon(s) and Role:     * Amelia Chow DPM - Primary            Surgical Staff:  Circ-1: Nicole Puga RN  Scrub Tech-1: Adele Linton  Event Time In   Incision Start 1835   Incision Close 1907     Anesthesia: MAC   Estimated Blood Loss: 30  Specimens:   ID Type Source Tests Collected by Time Destination   1 : left fifth toe Preservative   Amelia Chow DPM 4/12/2017 1841 Pathology   1 : lefth fifth toe Wound  CULTURE, ANAEROBIC, CULTURE, [address] [facility], [address] 4/12/2017 1851 Microbiology      Findings: necrosis of the left 5th toe. No abscess formation or purulence.     Complications: None  Implants: * No implants in log *

## 2017-04-12 NOTE — PERIOP NOTES
Handoff Report from Operating Room to PACU    Report received from K. Claudeen Dears and Anaid Cui CRNA regarding Jay Macdonald. Surgeon(s):  THUY Ruiz DPM  And Procedure(s) (LRB):  LEFT FIFTH TOE AMPUTATION (Left)  confirmed   with allergies and dressings discussed. Anesthesia type, drugs, patient history, complications, estimated blood loss, vital signs, intake and output, and last pain medication, lines and temperature were reviewed.

## 2017-04-12 NOTE — PROGRESS NOTES
Hospitalist Progress Note    NAME: Jillian Torres   :  1965   MRN:  [id number]       Interim Hospital Summary: 46 y.o. male whom presented on 4/10/2017 with      Assessment / Plan:  Sepsis with sinus tach and lactic acidosis  Due to Injury of L 5th toe, infected   Lactic acidosis, resolved  -concerning for osteo as hard to see osteal changes with fracture  -MRI showed fracture proximal phalanx of the little toe, edema.  -continue gentle IVF, empiric vancomycin and zosyn  -f/u with wound and BC  -discontinue lovenox, to resume after surgery  -NPO for surgery likely today  -podiatry following, appreciate recs     Type 2 diabetes, controlled, with neuropathy  -SSI     Hyperlipidemia  -continue stain     Smoking  -smoking cessation counseled   -declined nicotine patch     Alcohol use, ? Abuse  -CIWa with valium  -Thiamine, MVI and folic acid     Code Status: full     DVT Prophylaxis: Lovenox  GI Prophylaxis: not indicated     Baseline: functional       Subjective:     Chief Complaint / Reason for Physician Visit  Patient seen and examined. No acute complaints. Cezar Orourke to go to surgery  Discussed with RN events overnight. Review of Systems:  Symptom Y/N Comments  Symptom Y/N Comments   Fever/Chills n   Chest Pain n    Poor Appetite n   Edema y L foot   Cough n   Abdominal Pain     Sputum    Joint Pain y L foot   SOB/SETH n   Pruritis/Rash     Nausea/vomit    Tolerating PT/OT     Diarrhea    Tolerating Diet     Constipation    Other       Could NOT obtain due to:      Objective:     VITALS:   Last 24hrs VS reviewed since prior progress note.  Most recent are:  Patient Vitals for the past 24 hrs:   Temp Pulse Resp BP SpO2   17 0755 98.2 °F (36.8 °C) (!) 56 18 135/88 99 %   17 0413 98 °F (36.7 °C) 63 18 145/86 99 %   17 2346 97.1 °F (36.2 °C) 66 18 (!) 114/97 100 %   17 2201 98.8 °F (37.1 °C) 71 18 133/85 100 %   17 1450 98.2 °F (36.8 °C) 69 16 133/76 100 %   17 1224 98.6 °F (37 °C) 75 18 132/79 100 %       Intake/Output Summary (Last 24 hours) at 04/12/17 0854  Last data filed at 04/12/17 0413   Gross per 24 hour   Intake           3232.5 ml   Output                0 ml   Net           3232.5 ml        PHYSICAL EXAM:  General: WD, WN. Alert, cooperative, no acute distress    EENT:  EOMI. Anicteric sclerae. MMM  Resp:  CTA bilaterally, no wheezing or rales. No accessory muscle use  CV:  Regular  rhythm,  +edema L foot  GI:  Soft, Non distended, Non tender.  +Bowel sounds  Neurologic:  Alert and oriented X 3, normal speech  Ext:   L 5th toe infection with evidence of pus drainage  Psych:   Good insight. Not anxious nor agitated  Skin:  No rashes. No jaundice    Reviewed most current lab test results and cultures  YES  Reviewed most current radiology test results   YES  Review and summation of old records today    NO  Reviewed patient's current orders and MAR    YES  PMH/SH reviewed - no change compared to H&P  ________________________________________________________________________  Care Plan discussed with:    Comments   Patient x    Family      RN x    Care Manager     Consultant                        Multidiciplinary team rounds were held today with , nursing, pharmacist and clinical coordinator. Patient's plan of care was discussed; medications were reviewed and discharge planning was addressed. ________________________________________________________________________  Total NON critical care TIME:  35   Minutes    Total CRITICAL CARE TIME Spent:   Minutes non procedure based      Comments   >50% of visit spent in counseling and coordination of care     ________________________________________________________________________  Robert Ngo MD     Procedures: see electronic medical records for all procedures/Xrays and details which were not copied into this note but were reviewed prior to creation of Plan.       LABS:  I reviewed today's most current labs and imaging studies.   Pertinent labs include:  Recent Labs      04/12/17   0213  04/10/17   2355   WBC  9.9  9.3   HGB  12.9  13.6   HCT  39.2  41.1   PLT  263  291     Recent Labs      04/12/17   0213  04/10/17   2355   NA  141  145   K  4.1  4.0   CL  107  109*   CO2  25  23   GLU  81  84   BUN  13  11   CREA  0.69*  0.66*   CA  8.7  8.7   ALB  2.9*  3.2*   TBILI  0.3  0.1*   SGOT  10*  9*   ALT  17  19       Signed: Js Perkins MD

## 2017-04-12 NOTE — ANESTHESIA POSTPROCEDURE EVALUATION
Post-Anesthesia Evaluation and Assessment    Patient: Jomar Sexton MRN: [id number]  SSN: [id number]    YOB: 1965  Age: 46 y.o. Sex: male       Cardiovascular Function/Vital Signs  Visit Vitals    BP (!) 144/99 (BP 1 Location: Left arm, BP Patient Position: At rest)    Pulse 69    Temp 36.8 °C (98.2 °F)    Resp 15    Ht 5' 8\" (1.727 m)    Wt 85.3 kg (188 lb 0.8 oz)    SpO2 98%    BMI 28.59 kg/m2       Patient is status post total IV anesthesia, general anesthesia for Procedure(s):  LEFT FIFTH TOE AMPUTATION. Nausea/Vomiting: None    Postoperative hydration reviewed and adequate. Pain:  Pain Scale 1: Numeric (0 - 10) (04/12/17 1945)  Pain Intensity 1: 0 (04/12/17 1945)   Managed    Neurological Status:   Neuro (WDL): Exceptions to WDL (04/12/17 1918)  Neuro  Neurologic State: Drowsy; Eyes open spontaneously (04/12/17 1918)  Orientation Level: Oriented to person (04/12/17 1918)  Cognition: Follows commands (04/12/17 1918)  Speech: Clear (04/12/17 1918)  LUE Motor Response: Weak;Spontaneous  (04/12/17 1918)  LLE Motor Response: Weak;Spontaneous  (04/12/17 1918)  RUE Motor Response: Weak;Spontaneous  (04/12/17 1918)  RLE Motor Response: Weak;Spontaneous  (04/12/17 1918)   At baseline    Mental Status and Level of Consciousness: Arousable    Pulmonary Status:   O2 Device: Room air (04/12/17 1945)   Adequate oxygenation and airway patent    Complications related to anesthesia: None    Post-anesthesia assessment completed.  No concerns    Signed By: Nahun Anaya MD     April 12, 2017

## 2017-04-12 NOTE — PROGRESS NOTES
Hospitalist Progress Note    NAME: Modesta Macedo   :  1965   MRN:  [id number]       Interim Hospital Summary: 46 y.o. male whom presented on 4/10/2017 with      Assessment / Plan:  Sepsis with sinus tach and lactic acidosis  Due to Injury of L 5th toe, infected   Lactic acidosis, resolved  -concerning for osteo as hard to see osteal changes with fracture  -MRI showed fracture proximal phalanx of the little toe, edema.  -continue gentle IVF, empiric vancomycin and zosyn  -f/u with wound and BC  -NPO for surgery likely today  -podiatry following, appreciate recs     Type 2 diabetes, controlled, with neuropathy  -SSI     Hyperlipidemia  -continue stain     Smoking  -smoking cessation counseled   -declined nicotine patch     Alcohol use, ? Abuse  -CIWa with valium  -Thiamine, MVI and folic acid     Code Status: full     DVT Prophylaxis: Lovenox  GI Prophylaxis: not indicated     Baseline: functional       Subjective:     Chief Complaint / Reason for Physician Visit  Patient seen and examined. No acute complaints. [employer] to go to surgery  Discussed with RN events overnight. Review of Systems:  Symptom Y/N Comments  Symptom Y/N Comments   Fever/Chills n   Chest Pain n    Poor Appetite n   Edema y L foot   Cough n   Abdominal Pain     Sputum    Joint Pain y L foot   SOB/SETH n   Pruritis/Rash     Nausea/vomit    Tolerating PT/OT     Diarrhea    Tolerating Diet     Constipation    Other       Could NOT obtain due to:      Objective:     VITALS:   Last 24hrs VS reviewed since prior progress note.  Most recent are:  Patient Vitals for the past 24 hrs:   Temp Pulse Resp BP SpO2   17 0755 98.2 °F (36.8 °C) (!) 56 18 135/88 99 %   17 0413 98 °F (36.7 °C) 63 18 145/86 99 %   17 2346 97.1 °F (36.2 °C) 66 18 (!) 114/97 100 %   17 2201 98.8 °F (37.1 °C) 71 18 133/85 100 %   17 1450 98.2 °F (36.8 °C) 69 16 133/76 100 %   17 1224 98.6 °F (37 °C) 75 18 132/79 100 %       Intake/Output Summary (Last 24 hours) at 04/12/17 0846  Last data filed at 04/12/17 0413   Gross per 24 hour   Intake           3232.5 ml   Output                0 ml   Net           3232.5 ml        PHYSICAL EXAM:  General: WD, WN. Alert, cooperative, no acute distress    EENT:  EOMI. Anicteric sclerae. MMM  Resp:  CTA bilaterally, no wheezing or rales. No accessory muscle use  CV:  Regular  rhythm,  +edema L foot  GI:  Soft, Non distended, Non tender.  +Bowel sounds  Neurologic:  Alert and oriented X 3, normal speech  Ext:   L 5th toe infection with evidence of pus drainage  Psych:   Good insight. Not anxious nor agitated  Skin:  No rashes. No jaundice    Reviewed most current lab test results and cultures  YES  Reviewed most current radiology test results   YES  Review and summation of old records today    NO  Reviewed patient's current orders and MAR    YES  PMH/SH reviewed - no change compared to H&P  ________________________________________________________________________  Care Plan discussed with:    Comments   Patient x    Family      RN x    Care Manager     Consultant                        Multidiciplinary team rounds were held today with , nursing, pharmacist and clinical coordinator. Patient's plan of care was discussed; medications were reviewed and discharge planning was addressed. ________________________________________________________________________  Total NON critical care TIME:  35   Minutes    Total CRITICAL CARE TIME Spent:   Minutes non procedure based      Comments   >50% of visit spent in counseling and coordination of care     ________________________________________________________________________  Gilda Ziegler MD     Procedures: see electronic medical records for all procedures/Xrays and details which were not copied into this note but were reviewed prior to creation of Plan. LABS:  I reviewed today's most current labs and imaging studies.   Pertinent labs include:  Recent Labs 04/12/17   0213  04/10/17   2355   WBC  9.9  9.3   HGB  12.9  13.6   HCT  39.2  41.1   PLT  263  291     Recent Labs      04/12/17 0213  04/10/17   2355   NA  141  145   K  4.1  4.0   CL  107  109*   CO2  25  23   GLU  81  84   BUN  13  11   CREA  0.69*  0.66*   CA  8.7  8.7   ALB  2.9*  3.2*   TBILI  0.3  0.1*   SGOT  10*  9*   ALT  17  19       Signed: Rafael Ta MD

## 2017-04-12 NOTE — PROGRESS NOTES
Pharmacy Automatic Renal Dosing Protocol - Antimicrobials    Indication for Antimicrobials: diabetic foot infection / Left toe abscess with osteomyelitis  Current Regimen of Each Antimicrobial (Start Day & Day of Therapy):  Pip/tazo 3.375 gm every 8 hours (, day 3)  Vancomycin 1250 mg every 8 hours (, day 3)    Vancomycin Trough goal: 15- 20  Trough ordered for  AM    Significant Cultures:   4/10 bcx: ng x 1 days pending  4/10 wound: GPC and GNRs pending    CAPD, Hemodialysis or Renal Replacement Therapy:  na  Paralysis, amputations, malnutrition: na  Recent Labs      17   0213  04/10/17   2355   CREA  0.69*  0.66*   BUN  13  11   WBC  9.9  9.3     Temp (24hrs), Av.1 °F (36.7 °C), Min:97.1 °F (36.2 °C), Max:98.8 °F (37.1 °C)    Creatinine Clearance (Creatinine Clearance (ml/min)): > 100    Impression/Plan: Vanc trough drawn this AM is not accurate due to last night's dose being \"missed\". Will allow patient to get to steady state and will draw another trough tomorrow AM.       Pharmacy will follow daily and adjust medications as appropriate for renal function and/or serum levels.     Thank you,  Carlton Mclain, ETHAN     Renal Dosing Tables on [facility]

## 2017-04-12 NOTE — PROGRESS NOTES
Problem: Falls - Risk of  Goal: *Absence of falls  Outcome: Progressing Towards Goal  Patient will not sustain a fall during this hospitalization.

## 2017-04-12 NOTE — PROGRESS NOTES
Podiatry History and Physical    Subjective:         Patient is a 46 y.o.  male who is being seen for left 5th toe infection with likely osteomyelitis. Workup has revealed denies any n/f/v/c. MRI confirming osteo in the toe    Patient Active Problem List    Diagnosis Date Noted    Injury of toe, superficial, infected 04/11/2017    Type 2 diabetes, controlled, with neuropathy ([facility] [address] 07/18/2016    Hyperlipidemia LDL goal <70 07/18/2016    Vasculogenic erectile dysfunction 07/18/2016    Hypogonadism in male 07/18/2016     Past Medical History:   Diagnosis Date    Hyperlipidemia LDL goal <100     Type 2 diabetes mellitus ([facility] [address]     with neuropathy      Family History   Problem Relation Age of Onset    Diabetes Mother     Heart Disease Mother      cabg    Diabetes Father     Heart Disease Father      cabg    MS Paternal Grandfather       Social History   Substance Use Topics    Smoking status: Current Every Day Smoker     Packs/day: 1.00     Years: 35.00     Types: Cigarettes    Smokeless tobacco: Not on file    Alcohol use 0.0 oz/week     0 Standard drinks or equivalent per week      Comment: pt reports fifth of bourbon 4-5 days a week     History reviewed. No pertinent surgical history. Prior to Admission medications    Medication Sig Start Date End Date Taking? Authorizing Provider   sildenafil citrate (VIAGRA) 100 mg tablet Take 1 Tab by mouth as needed. 1/16/17  Yes Wynn Siemens, MD   sildenafil (REVATIO) 20 mg tablet Take up to 5 tabs as needed prior to activity 1/16/17  Yes Wynn Siemens, MD   atorvastatin (LIPITOR) 20 mg tablet Take  by mouth daily. Yes Historical Provider   gabapentin (NEURONTIN) 300 mg capsule Take 300 mg by mouth three (3) times daily. Yes Historical Provider   metFORMIN ER (GLUCOPHAGE XR) 500 mg tablet 1 tab twice daily 7/10/16  Yes Historical Provider   MV-MN/FA/VIT K/LYCOP/LUT/COQ10 (DAILY MULTIVITAMIN PO) Take  by mouth.    Yes Historical Provider No Known Allergies     Review of Systems:  A comprehensive review of systems was negative except for that written in the HPI. Objective:     Patient Vitals for the past 8 hrs:   BP Temp Pulse Resp SpO2   17 1707 137/49 98.3 °F (36.8 °C) 78 18 98 %   17 1644 139/87 98 °F (36.7 °C) 62 18 99 %   17 1133 128/79 98.2 °F (36.8 °C) 64 18 100 %     Temp (24hrs), Av.1 °F (36.7 °C), Min:97.1 °F (36.2 °C), Max:98.8 °F (37.1 °C)      General:  Alert, cooperative, well noursished, well developed, appears stated age   Eyes:  Sclera anicteric. Pupils equally round and reactive to light. Mouth/Throat: Mucous membranes normal, oral pharynx clear   Neck: Supple   Lungs:   Clear to auscultation bilaterally, good effort   CV:  Regular rate and rhythm,no murmur, click, rub or gallop   Abdomen:   Soft, non-tender. bowel sounds normal. non-distended   Extremities: Right plantar foot ulcer. Skin: Left 5ht toe with swelling, ulcer and purulent drainage. Noted probing to bone.    Lymph nodes: Cervical and supraclavicular normal   Musculoskeletal: No swelling or deformity   Lines/Devices:  Intact, no erythema, drainage or tenderness   Psych: Alert and oriented, normal mood affect given the setting           Data Review:   Recent Results (from the past 24 hour(s))   GLUCOSE, POC    Collection Time: 17  9:52 PM   Result Value Ref Range    Glucose (POC) 87 65 - 100 mg/dL    Performed by [facility]    METABOLIC PANEL, COMPREHENSIVE    Collection Time: 17  2:13 AM   Result Value Ref Range    Sodium 141 136 - 145 mmol/L    Potassium 4.1 3.5 - 5.1 mmol/L    Chloride 107 97 - 108 mmol/L    CO2 25 21 - 32 mmol/L    Anion gap 9 5 - 15 mmol/L    Glucose 81 65 - 100 mg/dL    BUN 13 6 - 20 MG/DL    Creatinine 0.69 (L) 0.70 - 1.30 MG/DL    BUN/Creatinine ratio 19 12 - 20      GFR est AA >60 >60 ml/min/1.73m2    GFR est non-AA >60 >60 ml/min/1.73m2    Calcium 8.7 8.5 - 10.1 MG/DL    Bilirubin, total 0.3 0.2 - 1.0 MG/DL    ALT (SGPT) 17 12 - 78 U/L    AST (SGOT) 10 (L) 15 - 37 U/L    Alk. phosphatase 70 45 - 117 U/L    Protein, total 6.9 6.4 - 8.2 g/dL    Albumin 2.9 (L) 3.5 - 5.0 g/dL    Globulin 4.0 2.0 - 4.0 g/dL    A-G Ratio 0.7 (L) 1.1 - 2.2     CBC WITH AUTOMATED DIFF    Collection Time: 04/12/17  2:13 AM   Result Value Ref Range    WBC 9.9 4.1 - 11.1 K/uL    RBC 4.22 4.10 - 5.70 M/uL    HGB 12.9 12.1 - 17.0 g/dL    HCT 39.2 36.6 - 50.3 %    MCV 92.9 80.0 - 99.0 FL    MCH 30.6 26.0 - 34.0 PG    MCHC 32.9 30.0 - 36.5 g/dL    RDW 13.0 11.5 - 14.5 %    PLATELET 765 118 - 882 K/uL    NEUTROPHILS 65 32 - 75 %    LYMPHOCYTES 25 12 - 49 %    MONOCYTES 6 5 - 13 %    EOSINOPHILS 3 0 - 7 %    BASOPHILS 1 0 - 1 %    ABS. NEUTROPHILS 6.5 1.8 - 8.0 K/UL    ABS. LYMPHOCYTES 2.5 0.8 - 3.5 K/UL    ABS. MONOCYTES 0.6 0.0 - 1.0 K/UL    ABS. EOSINOPHILS 0.3 0.0 - 0.4 K/UL    ABS.  BASOPHILS 0.1 0.0 - 0.1 K/UL   VANCOMYCIN, TROUGH    Collection Time: 04/12/17  2:13 AM   Result Value Ref Range    Vancomycin,trough 8.1 5.0 - 10.0 ug/mL    Reported dose date: NOT PROVIDED      Reported dose time: NOT PROVIDED      Reported dose: NOT PROVIDED UNITS   GLUCOSE, POC    Collection Time: 04/12/17  8:18 AM   Result Value Ref Range    Glucose (POC) 98 65 - 100 mg/dL    Performed by Shan Hubbard (PCT)    GLUCOSE, POC    Collection Time: 04/12/17 11:35 AM   Result Value Ref Range    Glucose (POC) 88 65 - 100 mg/dL    Performed by Shan Hubbard (PCT)    EKG, 12 LEAD, INITIAL    Collection Time: 04/12/17 12:55 PM   Result Value Ref Range    Ventricular Rate 60 BPM    Atrial Rate 60 BPM    P-R Interval 152 ms    QRS Duration 68 ms    Q-T Interval 430 ms    QTC Calculation (Bezet) 430 ms    Calculated P Axis 31 degrees    Calculated R Axis 32 degrees    Calculated T Axis 60 degrees    Diagnosis       Normal sinus rhythm  Normal ECG  No previous ECGs available     GLUCOSE, POC    Collection Time: 04/12/17  4:47 PM   Result Value Ref Range    Glucose (POC) 83 65 - 100 mg/dL    Performed by Macy Anaya    GLUCOSE, POC    Collection Time: 04/12/17  5:24 PM   Result Value Ref Range    Glucose (POC) 85 65 - 100 mg/dL    Performed by Mainor Motta*          Impression:     Left 5th toe abscess with osteomyelitis.     Recommendation:     -Consent obtained and in chart  -Will plan for left 5th toe amputation with closure of the wound.   -Left foot marked

## 2017-04-13 VITALS
BODY MASS INDEX: 28.5 KG/M2 | OXYGEN SATURATION: 98 % | RESPIRATION RATE: 18 BRPM | DIASTOLIC BLOOD PRESSURE: 79 MMHG | TEMPERATURE: 97.5 F | SYSTOLIC BLOOD PRESSURE: 122 MMHG | WEIGHT: 188.05 LBS | HEIGHT: 68 IN | HEART RATE: 66 BPM

## 2017-04-13 LAB
ATRIAL RATE: 60 BPM
BACTERIA SPEC CULT: ABNORMAL
BACTERIA SPEC CULT: ABNORMAL
CALCULATED P AXIS, ECG09: 31 DEGREES
CALCULATED R AXIS, ECG10: 32 DEGREES
CALCULATED T AXIS, ECG11: 60 DEGREES
DATE LAST DOSE: NORMAL
DIAGNOSIS, 93000: NORMAL
GLUCOSE BLD STRIP.AUTO-MCNC: 135 MG/DL (ref 65–100)
GRAM STN SPEC: ABNORMAL
P-R INTERVAL, ECG05: 152 MS
Q-T INTERVAL, ECG07: 430 MS
QRS DURATION, ECG06: 68 MS
QTC CALCULATION (BEZET), ECG08: 430 MS
REPORTED DOSE,DOSE: NORMAL UNITS
REPORTED DOSE/TIME,TMG: 1800
SERVICE CMNT-IMP: ABNORMAL
SERVICE CMNT-IMP: ABNORMAL
VANCOMYCIN TROUGH SERPL-MCNC: 9 UG/ML (ref 5–10)
VENTRICULAR RATE, ECG03: 60 BPM

## 2017-04-13 PROCEDURE — 74011000258 HC RX REV CODE- 258: Performed by: EMERGENCY MEDICINE

## 2017-04-13 PROCEDURE — 80202 ASSAY OF VANCOMYCIN: CPT | Performed by: PODIATRIST

## 2017-04-13 PROCEDURE — 74011250637 HC RX REV CODE- 250/637: Performed by: INTERNAL MEDICINE

## 2017-04-13 PROCEDURE — 74011250636 HC RX REV CODE- 250/636: Performed by: INTERNAL MEDICINE

## 2017-04-13 PROCEDURE — 82962 GLUCOSE BLOOD TEST: CPT

## 2017-04-13 PROCEDURE — 74011250636 HC RX REV CODE- 250/636: Performed by: EMERGENCY MEDICINE

## 2017-04-13 RX ORDER — FOLIC ACID 1 MG/1
1 TABLET ORAL DAILY
Qty: 30 TAB | Refills: 0 | Status: SHIPPED | OUTPATIENT
Start: 2017-04-13 | End: 2017-07-17

## 2017-04-13 RX ORDER — LANOLIN ALCOHOL/MO/W.PET/CERES
100 CREAM (GRAM) TOPICAL DAILY
Qty: 30 TAB | Refills: 0 | Status: SHIPPED | OUTPATIENT
Start: 2017-04-13 | End: 2017-07-17

## 2017-04-13 RX ORDER — AMOXICILLIN AND CLAVULANATE POTASSIUM 875; 125 MG/1; MG/1
1 TABLET, FILM COATED ORAL 2 TIMES DAILY
Qty: 14 TAB | Refills: 0 | Status: SHIPPED | OUTPATIENT
Start: 2017-04-13 | End: 2017-04-20

## 2017-04-13 RX ADMIN — Medication 10 ML: at 13:47

## 2017-04-13 RX ADMIN — PIPERACILLIN SODIUM,TAZOBACTAM SODIUM 3.38 G: 3; .375 INJECTION, POWDER, FOR SOLUTION INTRAVENOUS at 13:45

## 2017-04-13 RX ADMIN — GABAPENTIN 300 MG: 300 CAPSULE ORAL at 08:47

## 2017-04-13 RX ADMIN — ATORVASTATIN CALCIUM 20 MG: 20 TABLET, FILM COATED ORAL at 08:47

## 2017-04-13 RX ADMIN — Medication 10 ML: at 13:46

## 2017-04-13 RX ADMIN — Medication 10 ML: at 06:57

## 2017-04-13 RX ADMIN — VANCOMYCIN HYDROCHLORIDE 1250 MG: 10 INJECTION, POWDER, LYOPHILIZED, FOR SOLUTION INTRAVENOUS at 02:19

## 2017-04-13 RX ADMIN — ENOXAPARIN SODIUM 40 MG: 40 INJECTION SUBCUTANEOUS at 08:46

## 2017-04-13 RX ADMIN — Medication 100 MG: at 08:46

## 2017-04-13 RX ADMIN — Medication 10 ML: at 06:58

## 2017-04-13 RX ADMIN — FOLIC ACID 1 MG: 1 TABLET ORAL at 08:47

## 2017-04-13 RX ADMIN — MULTIPLE VITAMINS W/ MINERALS TAB 1 TABLET: TAB at 08:47

## 2017-04-13 RX ADMIN — PIPERACILLIN SODIUM,TAZOBACTAM SODIUM 3.38 G: 3; .375 INJECTION, POWDER, FOR SOLUTION INTRAVENOUS at 06:55

## 2017-04-13 NOTE — DISCHARGE SUMMARY
Discharge Summary    Name: Riley Baker  [id number]  YOB: 1965 (Age: 46 y.o.)   Date of Admission: 4/10/2017  Date of Discharge: 4/13/2017  Attending Physician: Ian Alvarez MD    Discharge Diagnosis:   T2DM  HLD      Consultants: Podiatry    Procedures:   L 5th toe amputation on 4/12/17    Brief Admission History/Reason for Admission Per Dean Szymanski MD:   Brittany Macdonald is a 46 y.o.  male who presents with left 5th toe infection. As per patient, for 3 days he is been having pain in left 5th toe and seems to got infected. He believe this is happened because of the shoe. Pt claims to have 7/10 pain, worsening with ambulation, constant, sharp in nature. For past 2 days he noted to have pus coming out of the wound. Pt denies any fever, chills, nausea, vomiting, diarrhea, chest pain, cough or shortness of breath. In ED Xray showed Fifth proximal phalangeal neck fracture. Pt also noted to have sinus tachycardia and lactic acidosis.     Brief Hospital Course by Main Problems:   Sepsis with sinus tach and lactic acidosis, resolved  L 5th osteomyelitis , infected s/p amputation  MRI showed fracture of proximal phalanx of the 5th toe with edema with likely osteomyelitis. Physical exam showed evidence of osteomyelitis. Patient was started on vancomycin, de-escalated to zosyn. He underwent amputation of L 5th toe and has remained hemodynamically stable. BC remained NTD. Ortho does not recommend long term IV abx. He will be de-escalated to complete 7 day course of empiric Augmentin. He will need to f/u with  podiatry in 1 week post discharge.      Type 2 diabetes, controlled, with neuropathy  BG stable on SSI      Hyperlipidemia, continue statin      Smoking, smoking cessation counseled. Declined nicotine patch      Alcohol abuse, pt was placed on CIWA with valium, however has not required any valium since admission.   He will be prescribed with hiamin and folic acid to take daily. Cessation counseled. Discharge Exam:  Patient seen and examined by me on discharge day. Pertinent Findings:  Visit Vitals    /79 (BP 1 Location: Left arm, BP Patient Position: At rest)    Pulse 66    Temp 97.5 °F (36.4 °C)    Resp 18    Ht 5' 8\" (1.727 m)    Wt 85.3 kg (188 lb 0.8 oz)    SpO2 98%    BMI 28.59 kg/m2     Gen:    Not in distress  Chest: Clear lungs  CVS:   Regular rhythm. No edema  Abd:  Soft, not distended, not tender    Discharge/Recent Laboratory Results:  Recent Labs      04/12/17 0213   NA  141   K  4.1   CL  107   CO2  25   BUN  13   GLU  81   CA  8.7     Recent Labs      04/12/17 0213   HGB  12.9   HCT  39.2   WBC  9.9   PLT  263       Discharge Medications:  Current Discharge Medication List      START taking these medications    Details   amoxicillin-clavulanate (AUGMENTIN) 875-125 mg per tablet Take 1 Tab by mouth two (2) times a day for 7 days. Qty: 14 Tab, Refills: 0      thiamine (B-1) 100 mg tablet Take 1 Tab by mouth daily. Qty: 30 Tab, Refills: 0      folic acid (FOLVITE) 1 mg tablet Take 1 Tab by mouth daily. Qty: 30 Tab, Refills: 0         CONTINUE these medications which have NOT CHANGED    Details   sildenafil (REVATIO) 20 mg tablet Take up to 5 tabs as needed prior to activity  Qty: 20 Tab, Refills: 11      atorvastatin (LIPITOR) 20 mg tablet Take  by mouth daily. gabapentin (NEURONTIN) 300 mg capsule Take 300 mg by mouth three (3) times daily. metFORMIN ER (GLUCOPHAGE XR) 500 mg tablet 1 tab twice daily  Refills: 0      MV-MN/FA/VIT K/LYCOP/LUT/COQ10 (DAILY MULTIVITAMIN PO) Take  by mouth.              DISPOSITION:    Home with Family: x   Home with HH/PT/OT/RN:    SNF/LTC:    LEONARDA:    OTHER:          Follow up with:   PCP : Elizabeth Avery MD  Follow-up Information     Follow up With Details Shantal Kelly MD In 3 days  [address] [facility] [address] Daly Maloney Brigido Hong In 5 days  [address] [facility]  [phone]            Diet:  diabetic      Total time in minutes spent coordinating this discharge (includes going over instructions, follow-up, prescriptions, and preparing report for sign off to her PCP) :  35 minutes

## 2017-04-13 NOTE — PERIOP NOTES
2008 TRANSFER - OUT REPORT:    Verbal report given to Sunitha NORRIS(name) on Cory Lawson  being transferred to 2136(unit) for routine post - op       Report consisted of patients Situation, Background, Assessment and   Recommendations(SBAR). Information from the following report(s) SBAR, Kardex and ED Summary, MAR, Wound, Allergies, NSR was reviewed with the receiving nurse. Lines:   Peripheral IV 04/10/17 Right; Inner Antecubital (Active)   Site Assessment Clean, dry, & intact 4/12/2017  8:00 PM   Phlebitis Assessment 0 4/12/2017  8:00 PM   Infiltration Assessment 0 4/12/2017  8:00 PM   Dressing Status Clean, dry, & intact 4/12/2017  8:00 PM   Dressing Type Transparent;Tape 4/12/2017  8:00 PM   Hub Color/Line Status Pink;Capped 4/12/2017  8:00 PM       Peripheral IV 04/12/17 Right Hand (Active)   Site Assessment Clean, dry, & intact 4/12/2017  8:00 PM   Phlebitis Assessment 0 4/12/2017  8:00 PM   Infiltration Assessment 0 4/12/2017  8:00 PM   Dressing Status Clean, dry, & intact 4/12/2017  8:00 PM   Dressing Type Transparent;Tape 4/12/2017  8:00 PM   Hub Color/Line Status Pink; Infusing 4/12/2017  8:00 PM        Opportunity for questions and clarification was provided.       Patient transported with:   Registered Nurse

## 2017-04-13 NOTE — OP NOTES
[facility] [address]   OP NOTE       Name:  Nicolasa Goff   MR#:  [id number]   :  1965   Account #:  [de-identified]    Surgery Date:  2017   Date of Adm:  04/10/2017       PREOPERATIVE DIAGNOSIS: Left fifth toe osteomyelitis. POSTOPERATIVE DIAGNOSIS: Left fifth toe osteomyelitis. PROCEDURES PERFORMED: Left fifth toe amputation. PREOPERATIVE INDICATIONS: The patient is a 30-year-old male,   a diabetic patient who presented with an ulceration to the left fifth toe   with purulent drainage and pathologic fracture of the left fifth toe   proximal phalanx from osteomyelitis. Discussed with the patient about all risks, benefits and alternatives of   the operative procedure, about the need for amputation of the toe due   to the abscess with osteomyelitis and underlying infection. MRI was   obtained which showed there was no metatarsal involvement at this   time. DESCRIPTION OF PROCEDURE:  The patient was seen in the   preoperative holding area. The left foot was marked. The consent was   obtained, the patient was identifiable. The left foot was the operative   side. The patient was then transported to the operating room and laid   on the table in the supine position. A first preoperative time-out was   performed to confirm that the left foot was the correct operative site. The patient was then sedated with MAC anesthesia, was prepped and   draped in the usual sterile fashion. After this, a second preop time-out   was performed to confirm that the left foot was the correct operative   site. Next, our attention was directed to the left foot, where no   tourniquet was used and 10 mL of 0.5% Marcaine plain local block of   the left lateral midfoot. After this was done, a 10 blade was then used   to make 2 semi-elliptical incisions about the base of the left fifth toe.    The toe was seen, there was noted to be a fracture of the mid shaft of   the proximal phalanx, which was soft and malleable. The toe was   removed and placed on to the back table. A fresh 15 blade was then   used to undermine the soft tissues down to the metatarsal head. The   collateral ligaments were released and a rongeur was used to remove   any fragmented bone. The flexor tendon was released as far proximal   as possible. There was no purulent drainage encountered at the level   of metatarsophalangeal joint. There was no probing medially to the   fourth metatarsal or any collections of purulent drainage. There was   excellent bleeding from the skin edges. After this, the site was   copiously irrigated with normal saline and then intraoperative cultures   of aerobic, anaerobic and Gram stain were obtained from the left fifth   toe amputation site. After this interrupted 4-0 Vicryl suture was then   used to reapproximate the subcutaneous tissues, and the skin was   used to close in interrupted fashion with a 4-0 nylon suture. Xeroform,   4 x 4's, rolled gauze, and an Ace wrap were applied to the left lower   extremity. The patient was then awakened and taken to the PACU with   all vitals stable and intact. DISPOSITION: The patient remained heel weightbearing to the left   lower extremity in a postoperative shoe. Will continue to followup on   the postoperative incision tomorrow. I feel at this time that all bone   infection was resolved with the amputation and no need for long-term   IV antibiotics. SURGEON: Jose Cortes DPM.    ANESTHESIA: MAC and then local, 10 mL of 0.5% Marcaine plain. ESTIMATED BLOOD LOSS: About 30 mL. SPECIMENS REMOVED: Left fifth toe for pathology and left fifth toe   cultures for anaerobic, aerobic, Gram stain. COMPLICATIONS: None. IMPLANTS: None.         KOBE Suarez / USHA   D:  04/12/2017   19:21   T:  04/12/2017   21:24   Job #:  [id number]

## 2017-04-13 NOTE — PROGRESS NOTES
GABRIEL spoke with the patient regarding discharge planning. He states that he hopes he can go home today but has not seen Dr. Jair Scott yet. His daughter will transport him home and she or his son can take him to his follow up appointments. He states he has an appointment with Dr. Leopoldo Martins for next Tuesday and is to follow up with Dr. Jair Scott two days after that. He has made the appointment with Dr. Leopoldo Martins and chose to make his appointment with Dr. Keisha Mcdermott.  Josie Bledsoe RN #[id number]

## 2017-04-13 NOTE — PROGRESS NOTES
End of Shift Nursing Note    Bedside shift change report given to  (oncoming nurse) by Elizabeth Dai RN (offgoing nurse). Report included the following information SBAR, Kardex, Intake/Output, MAR and Recent Results. Zone Phone:   [phone]    Significant changes during shift:    Patient discharged. Non-emergent issues for physician to address:        Number times ambulated in hallway past shift: 0. Up in room. Number of times OOB to chair past shift: 2    POD #:      Vital Signs:    Temp: 97.6 °F (36.4 °C)     Pulse (Heart Rate): (!) 58     BP: 118/67     Resp Rate: 16     O2 Sat (%): 98 %    Lines & Drains:     Urinary Catheter? No   Placement Date:    Medical Necessity:   Central Line? No   Placement Date:    Medical Necessity:   PICC Line? No   Placement Date:    Medical Necessity:     NG tube [] in [] removed [] not applicable   Drains [] in [] removed [] not applicable     Skin Integrity:      Wounds: yes   Dressings Present: yes    Wound Concerns: no      GI:    Current diet:  DIET DIABETIC CONSISTENT CARB Regular    Nausea: NO  Vomiting: NO  Bowel Sounds: YES  Flatus: YES  Last Bowel Movement: yesterday   Appearance:     Respiratory:  Supplemental O2: No      Device:    via  Liters/min     Incentive Spirometer: YES  Volume:   Coughing and Deep Breathing: YES  Oral Care: YES  Understanding (patient/family education): YES   Getting out of bed: YES  Head of bed elevation: YES    Patient Safety:    Falls Score: 1  Mobility Score: 0  Bed Alarm On? No  Sitter? No      Opportunity for questions and clarification was given to oncoming nurse. Patient bed is in upright position, side rails are up x 2, door & observation blinds open as needed, call bell within reach and patient not in distress.     [facility]

## 2017-04-13 NOTE — PROGRESS NOTES
Hospitalist Progress Note    NAME: Kandy Roland   :  1965   MRN:  [id number]       Interim Hospital Summary: 46 y.o. male whom presented on 4/10/2017 with      Assessment / Plan:  Sepsis with sinus tach and lactic acidosis, resolved  L 5th osteomyelitis , infected s/p amputation  -concerning for osteo as hard to see osteal changes with fracture  -MRI showed fracture proximal phalanx of the little toe, edema.  -dc IVF and vancomycin  -will continue with zosyn to complete 7 days treatment. Can de-escalate to PO abx on discharge. -f/u with wound and BC  -podiatry following, appreciate recs     Type 2 diabetes, controlled, with neuropathy  -BG stable on SSI     Hyperlipidemia  -continue stain     Smoking  -smoking cessation counseled   -declined nicotine patch     Alcohol use, ? Abuse  -CIWa with valium-has not required any since admission  -Thiamine, MVI and folic acid     Code Status: full     DVT Prophylaxis: Lovenox  GI Prophylaxis: not indicated     Baseline: functional       Subjective:     Chief Complaint / Reason for Physician Visit  Patient seen and examined. No acute complaints. Discussed with RN events overnight. Review of Systems:  Symptom Y/N Comments  Symptom Y/N Comments   Fever/Chills n   Chest Pain n    Poor Appetite n   Edema y L foot   Cough n   Abdominal Pain     Sputum    Joint Pain y L foot   SOB/SETH n   Pruritis/Rash     Nausea/vomit    Tolerating PT/OT     Diarrhea    Tolerating Diet y    Constipation    Other       Could NOT obtain due to:      Objective:     VITALS:   Last 24hrs VS reviewed since prior progress note.  Most recent are:  Patient Vitals for the past 24 hrs:   Temp Pulse Resp BP SpO2   17 0317 97.6 °F (36.4 °C) 62 15 122/78 95 %   17 2300 97.8 °F (36.6 °C) 68 15 132/81 98 %   17 2000 98 °F (36.7 °C) 69 15 134/85 98 %   17 -  15 (!) 144/99 98 %   17 -  17 151/89 99 %   17 -  19 (!) 148/92 99 %   17 1930 - 73 12 147/88 99 %   04/12/17 1925 - 75 14 (!) 149/92 100 %   04/12/17 1920 98.2 °F (36.8 °C) 78 14 (!) 156/95 99 %   04/12/17 1918 98.2 °F (36.8 °C) 74 13 157/84 -   04/12/17 1707 98.3 °F (36.8 °C) 78 18 137/49 98 %   04/12/17 1644 98 °F (36.7 °C) 62 18 139/87 99 %   04/12/17 1133 98.2 °F (36.8 °C) 64 18 128/79 100 %       Intake/Output Summary (Last 24 hours) at 04/13/17 0759  Last data filed at 04/13/17 0313   Gross per 24 hour   Intake          3259.58 ml   Output              700 ml   Net          2559.58 ml        PHYSICAL EXAM:  General: WD, WN. Alert, cooperative, no acute distress    EENT:  EOMI. Anicteric sclerae. MMM  Resp:  CTA bilaterally, no wheezing or rales. No accessory muscle use  CV:  Regular  rhythm,  +edema L foot  GI:  Soft, Non distended, Non tender.  +Bowel sounds  Neurologic:  Alert and oriented X 3, normal speech  Ext:   S/p L 5th toe removal  Psych:   Good insight. Not anxious nor agitated  Skin:  No rashes. No jaundice    Reviewed most current lab test results and cultures  YES  Reviewed most current radiology test results   YES  Review and summation of old records today    NO  Reviewed patient's current orders and MAR    YES  PMH/SH reviewed - no change compared to H&P  ________________________________________________________________________  Care Plan discussed with:    Comments   Patient x    Family      RN x    Care Manager     Consultant                        Multidiciplinary team rounds were held today with , nursing, pharmacist and clinical coordinator. Patient's plan of care was discussed; medications were reviewed and discharge planning was addressed.      ________________________________________________________________________  Total NON critical care TIME:  35   Minutes    Total CRITICAL CARE TIME Spent:   Minutes non procedure based      Comments   >50% of visit spent in counseling and coordination of care ________________________________________________________________________  Tyesha Rodriguez MD     Procedures: see electronic medical records for all procedures/Xrays and details which were not copied into this note but were reviewed prior to creation of Plan. LABS:  I reviewed today's most current labs and imaging studies.   Pertinent labs include:  Recent Labs      04/12/17 0213  04/10/17   2355   WBC  9.9  9.3   HGB  12.9  13.6   HCT  39.2  41.1   PLT  263  291     Recent Labs      04/12/17 0213  04/10/17   2355   NA  141  145   K  4.1  4.0   CL  107  109*   CO2  25  23   GLU  81  84   BUN  13  11   CREA  0.69*  0.66*   CA  8.7  8.7   ALB  2.9*  3.2*   TBILI  0.3  0.1*   SGOT  10*  9*   ALT  17  19       Signed: Tyesha Rodriguez MD

## 2017-04-14 LAB
BACTERIA SPEC CULT: NORMAL
SERVICE CMNT-IMP: NORMAL

## 2017-04-15 LAB
BACTERIA SPEC CULT: ABNORMAL
BACTERIA SPEC CULT: ABNORMAL
GRAM STN SPEC: ABNORMAL
GRAM STN SPEC: ABNORMAL
SERVICE CMNT-IMP: ABNORMAL

## 2017-04-16 LAB
BACTERIA SPEC CULT: NORMAL
SERVICE CMNT-IMP: NORMAL

## 2017-07-17 ENCOUNTER — OFFICE VISIT (OUTPATIENT)
Dept: ENDOCRINOLOGY | Age: 52
End: 2017-07-17

## 2017-07-17 VITALS
HEIGHT: 68 IN | SYSTOLIC BLOOD PRESSURE: 138 MMHG | DIASTOLIC BLOOD PRESSURE: 84 MMHG | WEIGHT: 196.2 LBS | HEART RATE: 74 BPM | BODY MASS INDEX: 29.73 KG/M2

## 2017-07-17 DIAGNOSIS — E78.5 HYPERLIPIDEMIA LDL GOAL <70: ICD-10-CM

## 2017-07-17 DIAGNOSIS — N52.9 VASCULOGENIC ERECTILE DYSFUNCTION, UNSPECIFIED VASCULOGENIC ERECTILE DYSFUNCTION TYPE: ICD-10-CM

## 2017-07-17 DIAGNOSIS — E29.1 HYPOGONADISM IN MALE: ICD-10-CM

## 2017-07-17 DIAGNOSIS — E11.40 TYPE 2 DIABETES, CONTROLLED, WITH NEUROPATHY (HCC): Primary | ICD-10-CM

## 2017-07-17 DIAGNOSIS — E55.9 VITAMIN D DEFICIENCY: ICD-10-CM

## 2017-07-17 NOTE — MR AVS SNAPSHOT
Visit Information Date & Time Provider Department Dept. Phone Encounter #  
 7/17/2017 10:30 AM Mg Devlin, [address] [place] [address] Diabetes and Endocrinology [phone] [id number] Follow-up Instructions Return in about 8 months (around 3/17/2018). Upcoming Health Maintenance Date Due Hepatitis C Screening 1965 Pneumococcal 19-64 Medium Risk (1 of 1 - PPSV23) 4/22/1984 DTaP/Tdap/Td series (1 - Tdap) 4/22/1986 FOBT Q 1 YEAR AGE 50-75 4/22/2015 EYE EXAM RETINAL OR DILATED Q1 4/18/2017 INFLUENZA AGE 9 TO ADULT 8/1/2017 MICROALBUMIN Q1 1/9/2018 HEMOGLOBIN A1C Q6M 1/10/2018 FOOT EXAM Q1 4/11/2018 LIPID PANEL Q1 7/10/2018 Allergies as of 7/17/2017  Review Complete On: 7/17/2017 By: Mg Devlin MD  
 No Known Allergies Current Immunizations  Never Reviewed No immunizations on file. Not reviewed this visit Vitals BP Pulse Height(growth percentile) Weight(growth percentile) BMI Smoking Status 138/84 (BP 1 Location: Left arm) 74 5' 8\" (1.727 m) 196 lb 3.2 oz (89 kg) 29.83 kg/m2 Current Every Day Smoker Vitals History BMI and BSA Data Body Mass Index Body Surface Area  
 29.83 kg/m 2 2.07 m 2 Preferred Pharmacy Pharmacy Name Phone [id number] [address] [phone] Your Updated Medication List  
  
   
This list is accurate as of: 7/17/17 11:19 AM.  Always use your most recent med list.  
  
  
  
  
 atorvastatin 20 mg tablet Commonly known as:  LIPITOR Take 10 mg by mouth daily. gabapentin 300 mg capsule Commonly known as:  NEURONTIN Take 300 mg by mouth three (3) times daily. metFORMIN  mg tablet Commonly known as:  GLUCOPHAGE XR Take 500 mg by mouth daily (with dinner). VITAMIN D3 PO Take 2,000 Units by mouth daily. Follow-up Instructions Return in about 8 months (around 3/17/2018). Patient Instructions 1) Check your bottle of vitamin D and if it's not 2000, increase your dose to at least this amount. 2) Your Hemoglobin A1c (3 month test of blood sugar) is now normal at 5.2%. 3) Stop your morning dose of metformin and just take the one in the evening. 4) Try 1/2 tab of atorvastatin for cholesterol to see if this helps with any aches and pains and cramping and in arms and legs. 5) I will send you a reminder letter 3-4 weeks prior to your next visit and you will have the order already in the [facility] system so you can just go sometime in the 3-7 days before the next visit to have your labs drawn. Introducing [facility] SERVICES! Ruth Larson introduces [employer] patient portal. Now you can access parts of your medical record, email your doctor's office, and request medication refills online. 1. In your internet browser, go to [email or web address] 2. Click on the First Time User? Click Here link in the Sign In box. You will see the New Member Sign Up page. 3. Enter your [employer] Access Code exactly as it appears below. You will not need to use this code after youve completed the sign-up process. If you do not sign up before the expiration date, you must request a new code. · [employer] Access Code: [email or web address] Expires: 9/2/2017  5:49 PM 
 
4. Enter the last four digits of your Social Security Number (xxxx) and Date of Birth (mm/dd/yyyy) as indicated and click Submit. You will be taken to the next sign-up page. 5. Create a [employer] ID. This will be your [employer] login ID and cannot be changed, so think of one that is secure and easy to remember. 6. Create a [employer] password. You can change your password at any time. 7. Enter your Password Reset Question and Answer. This can be used at a later time if you forget your password. 8. Enter your e-mail address. You will receive e-mail notification when new information is available in [address] 9. Click Sign Up. You can now view and download portions of your medical record. 10. Click the Download Summary menu link to download a portable copy of your medical information. If you have questions, please visit the Frequently Asked Questions section of the [employer] website. Remember, [employer] is NOT to be used for urgent needs. For medical emergencies, dial 911. Now available from your iPhone and Android! Please provide this summary of care documentation to your next provider. Your primary care clinician is listed as GALO Caicedo. If you have any questions after today's visit, please call [phone].

## 2017-07-17 NOTE — PROGRESS NOTES
Chief Complaint   Patient presents with    Diabetes     pcp and pharmacy     Other     eye exam is due     History of Present Illness: Tenzin Barry is a 46 y.o. male here for follow up of diabetes. Weight up 2 lbs since last visit in 1/17. Had an admission in 4/17 for an infected left 5th toe and this required amputation and was treated with abx. Still under the care of Dr. Kannan Duke. Compliant with meds below. Has noticed more pain in his arms making it difficult to raise them and also has some leg cramps. Has been back to 1ppd at the most.  Alcohol intake is the same. Vitamin D was 24 in 4/17 and was told to  some OTC D3 but he doesn't know how much he is taking. Still having fatigue. TSH 0.83 and CBC was normal in 4/17 at PCP's office. Current Outpatient Prescriptions   Medication Sig    CHOLECALCIFEROL, VITAMIN D3, (VITAMIN D3 PO) Take  by mouth.  atorvastatin (LIPITOR) 20 mg tablet Take  by mouth daily.  gabapentin (NEURONTIN) 300 mg capsule Take 300 mg by mouth three (3) times daily.  metFORMIN ER (GLUCOPHAGE XR) 500 mg tablet 1 tab twice daily     No current facility-administered medications for this visit. No Known Allergies     Review of Systems:  - Eyes: no blurry vision or double vision  - Cardiovascular: no chest pain  - Respiratory: no shortness of breath  - Musculoskeletal: no myalgias  - Neurological: no numbness/tingling in extremities    Physical Examination:  Blood pressure 138/84, pulse 74, height 5' 8\" (1.727 m), weight 196 lb 3.2 oz (89 kg).   - General: pleasant, no distress, good eye contact   - Neck: no carotid bruits  - Cardiovascular: regular, normal rate, nl s1 and s2, no m/r/g,   - Respiratory: clear bilaterally  - Integumentary: no edema,   - Psychiatric: normal mood and affect         Diabetic foot exam performed by Gabrielle Sandoval MD     Measurement  Response Nurse Comment Physician Comment   Monofilament  R - absent sensation with micro filament  L - absent sensation with micro filament     Pulse DP R - 2+ (normal)  L - 2+ (normal)     Pulse TP R - markedly decreased  L - absent     Structural deformity R - Mild - hammer toes  L - Mild - hammer toes, s/p amputation of 5th digit     Skin Integrity / Deformity R - Mild - callus  L - Mild - callus        Reviewed by:                 Data Reviewed: Component      Latest Ref Rng & Units 7/10/2017 7/10/2017 7/10/2017           8:51 AM  8:51 AM  8:51 AM   Glucose      65 - 99 mg/dL  95    BUN      6 - 24 mg/dL  15    Creatinine      0.76 - 1.27 mg/dL  0.89    GFR est non-AA      >59 mL/min/1.73  98    GFR est AA      >59 mL/min/1.73  114    BUN/Creatinine ratio      9 - 20  17    Sodium      134 - 144 mmol/L  145 (H)    Potassium      3.5 - 5.2 mmol/L  4.9    Chloride      96 - 106 mmol/L  104    CO2      18 - 29 mmol/L  26    Calcium      8.7 - 10.2 mg/dL  9.4    Protein, total      6.0 - 8.5 g/dL  6.9    Albumin      3.5 - 5.5 g/dL  4.4    GLOBULIN, TOTAL      1.5 - 4.5 g/dL  2.5    A-G Ratio      1.2 - 2.2  1.8    Bilirubin, total      0.0 - 1.2 mg/dL  0.3    Alk. phosphatase      39 - 117 IU/L  74    AST      0 - 40 IU/L  14    ALT (SGPT)      0 - 44 IU/L  14    Cholesterol, total      100 - 199 mg/dL 166     Triglyceride      0 - 149 mg/dL 48     HDL Cholesterol      >39 mg/dL 72     VLDL, calculated      5 - 40 mg/dL 10     LDL, calculated      0 - 99 mg/dL 84     Hemoglobin A1c, (calculated)      4.8 - 5.6 %   5.2   Estimated average glucose      mg/dL   103       Assessment/Plan:     1. Type 2 diabetes, controlled, with neuropathy ([facility] [address]): his most recent Hgb A1c was 5.2% in 7/17 down from down from 5.8% in 4/17 stable from 1/17 down from 6.4% in 7/16 stable from 3/16 down from 7% in 12/15 when diagnosed with DM. Metformin is working well and will hold on checking sugars as long as A1c is <7%.   Will stop morning dose of metformin  - decrease metformin ER to 500 mg at night only  - foot exam done 7/17  - optho UTD 4/16  - microalbumin nl 1/17  - his BP was at goal < 140/90 not on any meds  - check A1c and cmp and microalbumin prior to next visit      2. Hyperlipidemia LDL goal <70: LDL 78 in 3/16 after being started on lipitor 20 in 12/15 when  and TG 1152. LDL 71 and  in 7/16 and TG down to 119 and LDL 85 in 1/17 with 40 lb wt loss and 84 in 7/17. May be having myalgias from lipitor so will take 1/2 tab daily  - decrease lipitor 20 mg to 1/2 tab daily  - check lipids prior to next visit     3. Vasculogenic erectile dysfunction, unspecified vasculogenic erectile dysfunction type: told him that his ED is likely multifactorial due to DM, hyperlipidemia, ETOH and smoking.  - levitra 20 mg as needed      4. Hypogonadism in male: level was 156 in 12/15 but free T normal at 5.27 and prolactin normal at 9.2. T up to 298 in 7/16 with 18 lb wt loss and up to 456 in 1/17 with 40 lb wt loss  - check total testosterone level prior to next visit      5. Vitamin D deficiency: level was 24.2 in 7/17  - increase vitamin D to 2000 units daily  - check Vitamin D 25-OH level prior to next visit      Patient Instructions   1) Check your bottle of vitamin D and if it's not 2000, increase your dose to at least this amount. 2) Your Hemoglobin A1c (3 month test of blood sugar) is now normal at 5.2%. 3) Stop your morning dose of metformin and just take the one in the evening. 4) Try 1/2 tab of atorvastatin for cholesterol to see if this helps with any aches and pains and cramping and in arms and legs. 5) I will send you a reminder letter 3-4 weeks prior to your next visit and you will have the order already in the [facility] system so you can just go sometime in the 3-7 days before the next visit to have your labs drawn. Follow-up Disposition:  Return in about 8 months (around 3/17/2018).     Copy sent to:  Dr. Sobeida Pagan

## 2017-07-17 NOTE — PATIENT INSTRUCTIONS
1) Check your bottle of vitamin D and if it's not 2000, increase your dose to at least this amount. 2) Your Hemoglobin A1c (3 month test of blood sugar) is now normal at 5.2%. 3) Stop your morning dose of metformin and just take the one in the evening. 4) Try 1/2 tab of atorvastatin for cholesterol to see if this helps with any aches and pains and cramping and in arms and legs. 5) I will send you a reminder letter 3-4 weeks prior to your next visit and you will have the order already in the [facility] system so you can just go sometime in the 3-7 days before the next visit to have your labs drawn.

## 2018-01-17 ENCOUNTER — APPOINTMENT (OUTPATIENT)
Dept: CT IMAGING | Age: 53
End: 2018-01-17
Attending: EMERGENCY MEDICINE
Payer: COMMERCIAL

## 2018-01-17 ENCOUNTER — HOSPITAL ENCOUNTER (EMERGENCY)
Age: 53
Discharge: HOME | End: 2018-01-17
Attending: EMERGENCY MEDICINE
Payer: COMMERCIAL

## 2018-01-17 VITALS
OXYGEN SATURATION: 98 % | DIASTOLIC BLOOD PRESSURE: 94 MMHG | SYSTOLIC BLOOD PRESSURE: 177 MMHG | WEIGHT: 211.86 LBS | RESPIRATION RATE: 16 BRPM | BODY MASS INDEX: 30.33 KG/M2 | HEART RATE: 81 BPM | HEIGHT: 70 IN

## 2018-01-17 DIAGNOSIS — G51.0 BELL'S PALSY: Primary | ICD-10-CM

## 2018-01-17 DIAGNOSIS — R03.0 ELEVATED BLOOD PRESSURE READING: ICD-10-CM

## 2018-01-17 LAB
ALBUMIN SERPL-MCNC: 4.4 G/DL (ref 3.5–5)
ALBUMIN/GLOB SERPL: 1.1 {RATIO} (ref 1.1–2.2)
ALP SERPL-CCNC: 86 U/L (ref 45–117)
ALT SERPL-CCNC: 22 U/L (ref 12–78)
ANION GAP SERPL CALC-SCNC: 8 MMOL/L (ref 5–15)
AST SERPL-CCNC: 18 U/L (ref 15–37)
BASOPHILS # BLD: 0 K/UL (ref 0–0.1)
BASOPHILS NFR BLD: 0 % (ref 0–1)
BILIRUB SERPL-MCNC: 0.6 MG/DL (ref 0.2–1)
BUN SERPL-MCNC: 16 MG/DL (ref 6–20)
BUN/CREAT SERPL: 17 (ref 12–20)
CALCIUM SERPL-MCNC: 9.4 MG/DL (ref 8.5–10.1)
CHLORIDE SERPL-SCNC: 103 MMOL/L (ref 97–108)
CK SERPL-CCNC: 183 U/L (ref 39–308)
CO2 SERPL-SCNC: 29 MMOL/L (ref 21–32)
CREAT SERPL-MCNC: 0.92 MG/DL (ref 0.7–1.3)
EOSINOPHIL # BLD: 0.2 K/UL (ref 0–0.4)
EOSINOPHIL NFR BLD: 2 % (ref 0–7)
ERYTHROCYTE [DISTWIDTH] IN BLOOD BY AUTOMATED COUNT: 12.6 % (ref 11.5–14.5)
GLOBULIN SER CALC-MCNC: 4 G/DL (ref 2–4)
GLUCOSE BLD STRIP.AUTO-MCNC: 99 MG/DL (ref 65–100)
GLUCOSE SERPL-MCNC: 92 MG/DL (ref 65–100)
HCT VFR BLD AUTO: 46.6 % (ref 36.6–50.3)
HGB BLD-MCNC: 15.9 G/DL (ref 12.1–17)
LYMPHOCYTES # BLD: 2.5 K/UL (ref 0.8–3.5)
LYMPHOCYTES NFR BLD: 25 % (ref 12–49)
MCH RBC QN AUTO: 30.9 PG (ref 26–34)
MCHC RBC AUTO-ENTMCNC: 34.1 G/DL (ref 30–36.5)
MCV RBC AUTO: 90.5 FL (ref 80–99)
MONOCYTES # BLD: 0.5 K/UL (ref 0–1)
MONOCYTES NFR BLD: 5 % (ref 5–13)
NEUTS SEG # BLD: 6.7 K/UL (ref 1.8–8)
NEUTS SEG NFR BLD: 68 % (ref 32–75)
PLATELET # BLD AUTO: 233 K/UL (ref 150–400)
POTASSIUM SERPL-SCNC: 4.2 MMOL/L (ref 3.5–5.1)
PROT SERPL-MCNC: 8.4 G/DL (ref 6.4–8.2)
RBC # BLD AUTO: 5.15 M/UL (ref 4.1–5.7)
SERVICE CMNT-IMP: NORMAL
SODIUM SERPL-SCNC: 140 MMOL/L (ref 136–145)
TROPONIN I SERPL-MCNC: <0.04 NG/ML
WBC # BLD AUTO: 10 K/UL (ref 4.1–11.1)

## 2018-01-17 PROCEDURE — 36415 COLL VENOUS BLD VENIPUNCTURE: CPT | Performed by: EMERGENCY MEDICINE

## 2018-01-17 PROCEDURE — 80053 COMPREHEN METABOLIC PANEL: CPT | Performed by: EMERGENCY MEDICINE

## 2018-01-17 PROCEDURE — 74011250637 HC RX REV CODE- 250/637: Performed by: EMERGENCY MEDICINE

## 2018-01-17 PROCEDURE — 93005 ELECTROCARDIOGRAM TRACING: CPT

## 2018-01-17 PROCEDURE — 70450 CT HEAD/BRAIN W/O DYE: CPT

## 2018-01-17 PROCEDURE — 82962 GLUCOSE BLOOD TEST: CPT

## 2018-01-17 PROCEDURE — 74011636637 HC RX REV CODE- 636/637: Performed by: EMERGENCY MEDICINE

## 2018-01-17 PROCEDURE — 99284 EMERGENCY DEPT VISIT MOD MDM: CPT

## 2018-01-17 PROCEDURE — 82550 ASSAY OF CK (CPK): CPT | Performed by: EMERGENCY MEDICINE

## 2018-01-17 PROCEDURE — 84484 ASSAY OF TROPONIN QUANT: CPT | Performed by: EMERGENCY MEDICINE

## 2018-01-17 PROCEDURE — 85025 COMPLETE CBC W/AUTO DIFF WBC: CPT | Performed by: EMERGENCY MEDICINE

## 2018-01-17 RX ORDER — PREDNISONE 20 MG/1
60 TABLET ORAL
Status: COMPLETED | OUTPATIENT
Start: 2018-01-17 | End: 2018-01-17

## 2018-01-17 RX ORDER — VALACYCLOVIR HYDROCHLORIDE 500 MG/1
1000 TABLET, FILM COATED ORAL
Status: COMPLETED | OUTPATIENT
Start: 2018-01-17 | End: 2018-01-17

## 2018-01-17 RX ORDER — PREDNISONE 20 MG/1
60 TABLET ORAL DAILY
Qty: 18 TAB | Refills: 0 | Status: SHIPPED | OUTPATIENT
Start: 2018-01-17 | End: 2018-01-23

## 2018-01-17 RX ORDER — VALACYCLOVIR HYDROCHLORIDE 1 G/1
1000 TABLET, FILM COATED ORAL 3 TIMES DAILY
Qty: 21 TAB | Refills: 0 | Status: SHIPPED | OUTPATIENT
Start: 2018-01-17 | End: 2018-01-24

## 2018-01-17 RX ADMIN — VALACYCLOVIR HYDROCHLORIDE 1000 MG: 500 TABLET, FILM COATED ORAL at 18:20

## 2018-01-17 RX ADMIN — PREDNISONE 60 MG: 20 TABLET ORAL at 18:20

## 2018-01-17 NOTE — ED NOTES
Rounded on pt, updated on plan of care, waiting for test results. Call bell within reach, pt sitting in chair beside stretcher.

## 2018-01-17 NOTE — ED NOTES
Pt reports to the ED for c/c of left sided facial drop onset Sunday, started with muffled sound in left ear. Pt unable to close left eye lid, able to move eyes, denies vision changes. Pt denies being recently sick. Pt reports dizziness, sways back and forth when asked to close eyes and hold arms out. Pt reports a difference in taste since onset of symptoms. Pt ambulatory to room, standing beside stretcher, able to move all extremities.

## 2018-01-17 NOTE — LETTER
[address] 
[facility] EMERGENCY DEPT 
[address] [place] [address] [facility] [id number] 
[phone] Work/School Note Date: 1/17/2018 To Whom It May concern: 
 
Reba York was seen and treated today in the emergency room by the following provider(s): 
Attending Provider: Tran Hilton MD.   
 
Reba York may return to work on 1/23/18. Sincerely, Tran Hilton MD

## 2018-01-17 NOTE — ED PROVIDER NOTES
EMERGENCY DEPARTMENT HISTORY AND PHYSICAL EXAM      Date: 1/17/2018  Patient Name: Riley Baker    History of Presenting Illness     Chief Complaint   Patient presents with    Facial Droop     since Saturday, deneis hx of stroke, weakness in extremities, difficult swallowing, and slurred speech       History Provided By: Patient    HPI: Riley Baker, 46 y.o. male with PMHx significant for DM and neuropathy, presents ambulatory to the ED with cc of left sided facial droop x 3 days that has worsened since onset. Pt reports being unable to close his left eye. He also reports a change in the way things taste and a decrease in his hearing on the left side. He states that his sx started as a ringing in his left ear upon waking 3 days ago and gradually affected the left side of his face. Pt denies any recent illness. Pt affirms smoking about 1 pack per day and drinking EtOH daily. He denies illegal drug use. He denies numbness, weakness, slurred speech, vision changes, chest pain, dyspnea, vomiting, or HA. PCP: Keri Villatoro MD    There are no other complaints, changes, or physical findings at this time. Current Outpatient Prescriptions   Medication Sig Dispense Refill    predniSONE (DELTASONE) 20 mg tablet Take 3 Tabs by mouth daily for 6 days. Indications: MONITOR BLOOD SUGAR WHILE TAKING 18 Tab 0    valACYclovir (VALTREX) 1 gram tablet Take 1 Tab by mouth three (3) times daily for 7 days. 21 Tab 0    atorvastatin (LIPITOR) 20 mg tablet Take 10 mg by mouth daily.  gabapentin (NEURONTIN) 300 mg capsule Take 300 mg by mouth three (3) times daily.  metFORMIN ER (GLUCOPHAGE XR) 500 mg tablet Take 500 mg by mouth daily (with dinner). 0    CHOLECALCIFEROL, VITAMIN D3, (VITAMIN D3 PO) Take 2,000 Units by mouth daily.          Past History     Past Medical History:  Past Medical History:   Diagnosis Date    Hyperlipidemia LDL goal <100     Type 2 diabetes mellitus ([address]     with neuropathy Past Surgical History:  Past Surgical History:   Procedure Laterality Date    HX AMPUTATION Left 04/2017    5th toe       Family History:  Family History   Problem Relation Age of Onset    Diabetes Mother     Heart Disease Mother      cabg    Diabetes Father     Heart Disease Father      cabg    MS Paternal Grandfather        Social History:  Social History   Substance Use Topics    Smoking status: Current Every Day Smoker     Packs/day: 1.00     Years: 35.00     Types: Cigarettes    Smokeless tobacco: Never Used    Alcohol use 0.0 oz/week     0 Standard drinks or equivalent per week      Comment: pt reports fifth of bourbon 4-5 days a week       Allergies: Allergies   Allergen Reactions    Adhesive Rash         Review of Systems   Review of Systems   Constitutional: Negative for chills and fever. HENT: Positive for hearing loss (decreased hearing on left side) and tinnitus. Negative for congestion. Positive for a change in the way things taste. Eyes: Negative for visual disturbance. Positive for being unable to close left eye   Respiratory: Negative for chest tightness. Cardiovascular: Negative for chest pain and leg swelling. Gastrointestinal: Negative for abdominal pain and vomiting. Endocrine: Negative for polyuria. Genitourinary: Negative for dysuria and frequency. Musculoskeletal: Negative for myalgias. Skin: Negative for color change. Allergic/Immunologic: Negative for immunocompromised state. Neurological: Positive for facial asymmetry (left sided droop). Negative for speech difficulty, weakness, numbness and headaches. Physical Exam   Physical Exam  Nursing note and vitals reviewed.   General appearance: non-toxic  Eyes: PERRL, EOMI, conjunctiva normal, anicteric sclera, unable to close left eye,  HEENT: mucous membranes moist, oropharynx is clear, uvula midline, tongue midline, TM normal with no erythema  Pulmonary: clear to auscultation bilaterally  Cardiac: normal rate and regular rhythm, no murmurs, gallops, or rubs, 2+DP pulses, 2+ radial pulses  Abdomen: soft, nontender, nondistended, bowel sounds present  MSK: no pre-tibial edema, s/p amputation of 5th digit of left foot  Neuro: Alert, answers questions appropriately, complete paralysis of upper and lower L face, negative pronator drift, strength 5/5 all 4 extremities, light touch intact all 4 ext, gait normal w/o ataxia, CN II-XII otherwise intact  Skin: capillary refill brisk    Diagnostic Study Results     Labs -     Recent Results (from the past 12 hour(s))   GLUCOSE, POC    Collection Time: 01/17/18  4:29 PM   Result Value Ref Range    Glucose (POC) 99 65 - 100 mg/dL    Performed by Duffy Collette    EKG, 12 LEAD, INITIAL    Collection Time: 01/17/18  5:08 PM   Result Value Ref Range    Ventricular Rate 78 BPM    Atrial Rate 78 BPM    P-R Interval 148 ms    QRS Duration 74 ms    Q-T Interval 384 ms    QTC Calculation (Bezet) 437 ms    Calculated P Axis 6 degrees    Calculated R Axis 17 degrees    Calculated T Axis 47 degrees    Diagnosis       Normal sinus rhythm  Normal ECG  When compared with ECG of 12-APR-2017 12:55,  No significant change was found     CBC WITH AUTOMATED DIFF    Collection Time: 01/17/18  5:13 PM   Result Value Ref Range    WBC 10.0 4.1 - 11.1 K/uL    RBC 5.15 4.10 - 5.70 M/uL    HGB 15.9 12.1 - 17.0 g/dL    HCT 46.6 36.6 - 50.3 %    MCV 90.5 80.0 - 99.0 FL    MCH 30.9 26.0 - 34.0 PG    MCHC 34.1 30.0 - 36.5 g/dL    RDW 12.6 11.5 - 14.5 %    PLATELET 266 646 - 719 K/uL    NEUTROPHILS 68 32 - 75 %    LYMPHOCYTES 25 12 - 49 %    MONOCYTES 5 5 - 13 %    EOSINOPHILS 2 0 - 7 %    BASOPHILS 0 0 - 1 %    ABS. NEUTROPHILS 6.7 1.8 - 8.0 K/UL    ABS. LYMPHOCYTES 2.5 0.8 - 3.5 K/UL    ABS. MONOCYTES 0.5 0.0 - 1.0 K/UL    ABS. EOSINOPHILS 0.2 0.0 - 0.4 K/UL    ABS.  BASOPHILS 0.0 0.0 - 0.1 K/UL   METABOLIC PANEL, COMPREHENSIVE    Collection Time: 01/17/18  5:13 PM   Result Value Ref Range    Sodium 140 136 - 145 mmol/L    Potassium 4.2 3.5 - 5.1 mmol/L    Chloride 103 97 - 108 mmol/L    CO2 29 21 - 32 mmol/L    Anion gap 8 5 - 15 mmol/L    Glucose 92 65 - 100 mg/dL    BUN 16 6 - 20 MG/DL    Creatinine 0.92 0.70 - 1.30 MG/DL    BUN/Creatinine ratio 17 12 - 20      GFR est AA >60 >60 ml/min/1.73m2    GFR est non-AA >60 >60 ml/min/1.73m2    Calcium 9.4 8.5 - 10.1 MG/DL    Bilirubin, total 0.6 0.2 - 1.0 MG/DL    ALT (SGPT) 22 12 - 78 U/L    AST (SGOT) 18 15 - 37 U/L    Alk. phosphatase 86 45 - 117 U/L    Protein, total 8.4 (H) 6.4 - 8.2 g/dL    Albumin 4.4 3.5 - 5.0 g/dL    Globulin 4.0 2.0 - 4.0 g/dL    A-G Ratio 1.1 1.1 - 2.2     CK    Collection Time: 01/17/18  5:13 PM   Result Value Ref Range     39 - 308 U/L   TROPONIN I    Collection Time: 01/17/18  5:13 PM   Result Value Ref Range    Troponin-I, Qt. <0.04 <0.05 ng/mL       Radiologic Studies -   CT Results  (Last 48 hours)               01/17/18 1656  CT HEAD WO CONT Final result    Impression:  IMPRESSION: No acute intracranial abnormality. Narrative:  HEAD CT WITHOUT CONTRAST: 1/17/2018 4:56 PM       INDICATION: Facial muscle weakness/paralysis. Left-sided facial droop starting   on Sunday, with muffled sounds in the left ear. Dizziness. Change in taste. COMPARISON: None. PROCEDURE: Axial images of the head were obtained without contrast. Coronal and   sagittal reformats were performed. CT dose reduction was achieved through use of   a standardized protocol tailored for this examination and automatic exposure   control for dose modulation. FINDINGS: The ventricles and sulci are appropriate in size and configuration for   age. No loss of gray-white differentiation to suggest late acute or early   subacute infarction. No mass effect or intracranial hemorrhage. Medical Decision Making   I am the first provider for this patient.     I reviewed the vital signs, available nursing notes, past medical history, past surgical history, family history and social history. Vital Signs-Reviewed the patient's vital signs. Patient Vitals for the past 12 hrs:   Pulse Resp BP SpO2   01/17/18 1608 81 16 (!) 177/94 98 %       EKG interpretation: (Preliminary) 17:08  Rhythm: normal sinus rhythm; and regular . Rate (approx.): 78; Axis: normal; WA interval: 148; QRS interval: 74; QT/QTc: 384/437; ST/T wave: no ST elevation or depression. Written by COTY Morejonibjamal, as dictated by Hortencia Nolan MD.    Records Reviewed: Old Medical Records    Provider Notes (Medical Decision Making):   Clinical exam involves upper and lower face. Highly suspicious for Bell's Palsy. Will check labs and CT given pt's hx of DM. ED Course:   Initial assessment performed. The patients presenting problems have been discussed, and they are in agreement with the care plan formulated and outlined with them. I have encouraged them to ask questions as they arise throughout their visit. Disposition:  Discharge Note:  6:39 PM  The pt is ready for discharge. The pt's signs, symptoms, diagnosis, and discharge instructions have been discussed and pt has conveyed their understanding. The pt is to follow up as recommended or return to ER should their symptoms worsen. Plan has been discussed and pt is in agreement. PLAN:  1. Current Discharge Medication List      START taking these medications    Details   predniSONE (DELTASONE) 20 mg tablet Take 3 Tabs by mouth daily for 6 days. Indications: MONITOR BLOOD SUGAR WHILE TAKING  Qty: 18 Tab, Refills: 0      valACYclovir (VALTREX) 1 gram tablet Take 1 Tab by mouth three (3) times daily for 7 days. Qty: 21 Tab, Refills: 0         CONTINUE these medications which have NOT CHANGED    Details   atorvastatin (LIPITOR) 20 mg tablet Take 10 mg by mouth daily. gabapentin (NEURONTIN) 300 mg capsule Take 300 mg by mouth three (3) times daily.       metFORMIN ER (GLUCOPHAGE XR) 500 mg tablet Take 500 mg by mouth daily (with dinner). Refills: 0      CHOLECALCIFEROL, VITAMIN D3, (VITAMIN D3 PO) Take 2,000 Units by mouth daily. 2.   Follow-up Information     Follow up With Details Comments Contact Info    Lorri Cortes MD Schedule an appointment as soon as possible for a visit in 1 week  [address] [facility]  [phone]      [facility] EMERGENCY DEPT Go in 1 day If symptoms worsen [address] [place] [address]  [phone]        Return to ED if worse     Diagnosis     Clinical Impression:   1. Bell's palsy    2. Elevated blood pressure reading        Attestations: This note is prepared by Natividad Lyn, acting as a Scribe for MD Sharon Pedraza MD: The scribe's documentation has been prepared under my direction and personally reviewed by me in its entirety. I confirm that the notes above accurately reflects all work, treatment, procedures, and medical decision making performed by me.

## 2018-01-17 NOTE — ED NOTES
MD Rao Baker has reviewed discharge instructions with the patient. The patient verbalized understanding. Pt discharged with written instructions. No further concerns at this time. IV removed. Pt given prescriptions and ED excuse note. Pt ambulatory to exit.

## 2018-01-17 NOTE — DISCHARGE INSTRUCTIONS
YOU SHOULD USE EYE DROPS IN YOUR LEFT EYE AND WEAR AN EYE PATCH AT NIGHT FOR SLEEPING TO AVOID INJURY TO YOUR EYE. Bell's Palsy: Care Instructions  Your Care Instructions    Bell's palsy is paralysis or weakness of the muscles on one side of the face. Often people with Bell's palsy have a droop on one side of the mouth and have trouble completely shutting the eye on the same side. Bell's palsy can also interfere with the sense of taste. These things happen when a nerve in your face becomes inflamed. Bell's palsy is not caused by a stroke. The cause of the nerve inflammation is not known. But some experts think that a virus may cause it. Because of this, doctors sometimes prescribe antiviral medicine to treat it. You also may get medicine to reduce swelling. Bell's palsy usually gets better on its own in a few weeks or months. Follow-up care is a key part of your treatment and safety. Be sure to make and go to all appointments, and call your doctor if you are having problems. It's also a good idea to know your test results and keep a list of the medicines you take. How can you care for yourself at home? · Take your medicines exactly as prescribed. Call your doctor if you think you are having a problem with your medicine. You will get more details on the specific medicines your doctor prescribes. · Use artificial tears or ointment if your eyes are too dry. Bell's palsy can make your lower eyelid droop, causing a dry eye. · If you cannot completely close your eye, consider using an eye patch while you sleep. · Help yourself blink by using your finger to close and open your eyelid. This may help keep your eye moist.  · Wear glasses or goggles to keep dust and dirt out of your eye. · As feeling comes back to your face, massage your forehead, cheeks, and lips. Massage may make the muscles in your face stronger. · Brush and floss your teeth often to help prevent tooth decay.  Bell's palsy can dry up the spit on one side of your mouth. This increases the risk of tooth decay. When should you call for help? Call 911 anytime you think you may need emergency care. For example, call if:  ? · You have symptoms of a stroke. These may include:  ¨ Sudden numbness, tingling, weakness, or loss of movement in your face, arm, or leg, especially on only one side of your body. ¨ Sudden vision changes. ¨ Sudden trouble speaking. ¨ Sudden confusion or trouble understanding simple statements. ¨ Sudden problems with walking or balance. ¨ A sudden, severe headache that is different from past headaches. ?Call your doctor now or seek immediate medical care if:  ? · You have numbness or weakness that spreads beyond one side of your face. ? · You have a skin rash or eye pain or redness, or light bothers your eyes. ? · You have a new or worse headache. ? Watch closely for changes in your health, and be sure to contact your doctor if:  ? · You do not get better as expected. Where can you learn more? Go to http://remi-aby.info/. Enter P168 in the search box to learn more about \"Bell's Palsy: Care Instructions. \"  Current as of: October 14, 2016  Content Version: 11.4  © 9495-4247 [employer]. Care instructions adapted under license by [employer] (which disclaims liability or warranty for this information). If you have questions about a medical condition or this instruction, always ask your healthcare professional. [place] [address] any warranty or liability for your use of this information. Elevated Blood Pressure: Care Instructions  Your Care Instructions    Blood pressure is a measure of how hard the blood pushes against the walls of your arteries. It's normal for blood pressure to go up and down throughout the day. But if it stays up over time, you have high blood pressure. Two numbers tell you your blood pressure.  The first number is the systolic pressure. It shows how hard the blood pushes when your heart is pumping. The second number is the diastolic pressure. It shows how hard the blood pushes between heartbeats, when your heart is relaxed and filling with blood. An ideal blood pressure in adults is less than 120/80 (say \"120 over 80\"). High blood pressure is 140/90 or higher. You have high blood pressure if your top number is 140 or higher or your bottom number is 90 or higher, or both. The main test for high blood pressure is simple, fast, and painless. To diagnose high blood pressure, your doctor will test your blood pressure at different times. After testing your blood pressure, your doctor may ask you to test it again when you are home. If you are diagnosed with high blood pressure, you can work with your doctor to make a long-term plan to manage it. Follow-up care is a key part of your treatment and safety. Be sure to make and go to all appointments, and call your doctor if you are having problems. It's also a good idea to know your test results and keep a list of the medicines you take. How can you care for yourself at home? · Do not smoke. Smoking increases your risk for heart attack and stroke. If you need help quitting, talk to your doctor about stop-smoking programs and medicines. These can increase your chances of quitting for good. · Stay at a healthy weight. · Try to limit how much sodium you eat to less than 2,300 milligrams (mg) a day. Your doctor may ask you to try to eat less than 1,500 mg a day. · Be physically active. Get at least 30 minutes of exercise on most days of the week. Walking is a good choice. You also may want to do other activities, such as running, swimming, cycling, or playing tennis or team sports. · Avoid or limit alcohol. Talk to your doctor about whether you can drink any alcohol. · Eat plenty of fruits, vegetables, and low-fat dairy products. Eat less saturated and total fats.   · Learn how to check your blood pressure at home. When should you call for help? Call your doctor now or seek immediate medical care if:  ? · Your blood pressure is much higher than normal (such as 180/110 or higher). ? · You think high blood pressure is causing symptoms such as:  ¨ Severe headache. ¨ Blurry vision. ? Watch closely for changes in your health, and be sure to contact your doctor if:  ? · You do not get better as expected. Where can you learn more? Go to http://remi-aby.info/. Enter U631 in the search box to learn more about \"Elevated Blood Pressure: Care Instructions. \"  Current as of: September 21, 2016  Content Version: 11.4  © 6102-2332 [employer]. Care instructions adapted under license by [employer] (which disclaims liability or warranty for this information). If you have questions about a medical condition or this instruction, always ask your healthcare professional. [address] any warranty or liability for your use of this information.

## 2018-01-18 LAB
ATRIAL RATE: 78 BPM
CALCULATED P AXIS, ECG09: 6 DEGREES
CALCULATED R AXIS, ECG10: 17 DEGREES
CALCULATED T AXIS, ECG11: 47 DEGREES
DIAGNOSIS, 93000: NORMAL
P-R INTERVAL, ECG05: 148 MS
Q-T INTERVAL, ECG07: 384 MS
QRS DURATION, ECG06: 74 MS
QTC CALCULATION (BEZET), ECG08: 437 MS
VENTRICULAR RATE, ECG03: 78 BPM

## 2018-02-21 DIAGNOSIS — E78.5 HYPERLIPIDEMIA LDL GOAL <70: ICD-10-CM

## 2018-02-21 DIAGNOSIS — E55.9 VITAMIN D DEFICIENCY: ICD-10-CM

## 2018-02-21 DIAGNOSIS — N52.9 VASCULOGENIC ERECTILE DYSFUNCTION, UNSPECIFIED VASCULOGENIC ERECTILE DYSFUNCTION TYPE: ICD-10-CM

## 2018-02-21 DIAGNOSIS — E29.1 HYPOGONADISM IN MALE: ICD-10-CM

## 2018-02-21 DIAGNOSIS — E11.40 TYPE 2 DIABETES, CONTROLLED, WITH NEUROPATHY (HCC): ICD-10-CM

## 2025-05-10 NOTE — PROGRESS NOTES
Pharmacy  Vancomycin Dosing    Pharmacy consulted to dose vancomycin for this 46 y.o. male being treated for Diabetic foot infection    Other Current Antimicrobials:Zosyn 3.375 gm every 8 hrs  Significant Cultures:       Height and Weight  Ht Readings from Last 1 Encounters:   04/10/17 172.7 cm (68\")      Wt Readings from Last 1 Encounters:   04/10/17 85.3 kg (188 lb 0.8 oz)         Renal Function Creatinine Clearance (ml/min): 140    Recent Labs      04/10/17   2355   CREA  0.66*   BUN  11      WBC Recent Labs      04/10/17   2355   WBC  9.3      Temp 97.9 °F (36.6 °C)     Loading dose: Vancomycin 2000 mg X 1 dose  Maintenance dose: Vancomycin 1250 mg every 8 hrs  Goal Level: 15 - 20  Pharmacy will follow daily and adjust as appropriate. Thanks for the consult,  Presley Skinner, PHARMD       Loading dose entered? Yes   Daily BMP ordered? Yes   Maintenance dose entered? Yes   Previous order discontinued? Yes   Progress note entered? Yes   If Zosyn is ordered call the physician and suggest Cefepime? [facility] Toxicity Data No   Ivent completed?  Yes Other (specify diet and fluid)

## (undated) DEVICE — SURGICAL PROCEDURE PACK BASIN MAJ SET CUST NO CAUT

## (undated) DEVICE — STERILE POLYISOPRENE POWDER-FREE SURGICAL GLOVES: Brand: PROTEXIS

## (undated) DEVICE — BASIC PACK: Brand: CONVERTORS

## (undated) DEVICE — GAUZE SPONGES,12 PLY: Brand: CURITY

## (undated) DEVICE — MEDI-VAC NON-CONDUCTIVE SUCTION TUBING: Brand: CARDINAL HEALTH

## (undated) DEVICE — INFECTION CONTROL KIT SYS

## (undated) DEVICE — CULTURETTE SGL EVAC TUBE PALL -- 100/CA

## (undated) DEVICE — SOLUTION IV 1000ML 0.9% SOD CHL

## (undated) DEVICE — SWAB CULT LIQ STUART AGR AERB MOD IN BRK SGL RAYON TIP PLAS 220099] BECTON DICKINSON MICRO]

## (undated) DEVICE — PENCIL ES L3M BTTN SWCH S STL HEX LOK BLDE ELECTRD HOLSTER

## (undated) DEVICE — DRAPE,EXTREMITY,89X128,STERILE: Brand: MEDLINE

## (undated) DEVICE — SUT ETHLN 3-0 18IN PS1 BLK --

## (undated) DEVICE — DEVICE TRNSF SPIK STL 2008S] MICROTEK MEDICAL INC]

## (undated) DEVICE — HOOK LOCK LATEX FREE ELASTIC BANDAGE 4INX5YD

## (undated) DEVICE — (D)SYR 10ML 1/5ML GRAD NSAF -- PKGING CHANGE USE ITEM 338027

## (undated) DEVICE — STERILE POLYISOPRENE POWDER-FREE SURGICAL GLOVES WITH EMOLLIENT COATING: Brand: PROTEXIS

## (undated) DEVICE — OCCLUSIVE GAUZE STRIP,3% BISMUTH TRIBROMOPHENATE IN PETROLATUM BLEND: Brand: XEROFORM

## (undated) DEVICE — HANDLE LT SNAP ON ULT DURABLE LENS FOR TRUMPF ALC DISPOSABLE

## (undated) DEVICE — REM POLYHESIVE ADULT PATIENT RETURN ELECTRODE: Brand: VALLEYLAB

## (undated) DEVICE — SYR IRR BLB 2OZ DISP BLU STRL -- CONVERT TO ITEM 357637

## (undated) DEVICE — STRETCH BANDAGE ROLL: Brand: DERMACEA

## (undated) DEVICE — INTENDED FOR TISSUE SEPARATION, AND OTHER PROCEDURES THAT REQUIRE A SHARP SURGICAL BLADE TO PUNCTURE OR CUT.: Brand: BARD-PARKER ® CARBON RIB-BACK BLADES

## (undated) DEVICE — GOWN,SIRUS,NONRNF,SETINSLV,XL,20/CS: Brand: MEDLINE

## (undated) DEVICE — DEVON™ KNEE AND BODY STRAP 60" X 3" (1.5 M X 7.6 CM): Brand: DEVON

## (undated) DEVICE — NEEDLE HYPO 25GA L1.5IN BVL ORIENTED ECLIPSE